# Patient Record
Sex: FEMALE | Race: WHITE | Employment: UNEMPLOYED | URBAN - METROPOLITAN AREA
[De-identification: names, ages, dates, MRNs, and addresses within clinical notes are randomized per-mention and may not be internally consistent; named-entity substitution may affect disease eponyms.]

---

## 2019-04-15 ENCOUNTER — HOSPITAL ENCOUNTER (OUTPATIENT)
Dept: ULTRASOUND IMAGING | Age: 54
Discharge: HOME | End: 2019-04-17
Payer: OTHER GOVERNMENT

## 2019-04-15 DIAGNOSIS — N81.4 VAGINAL AND CERVICAL PROLAPSE: ICD-10-CM

## 2019-04-15 DIAGNOSIS — R30.0 DYSURIA: ICD-10-CM

## 2019-04-15 DIAGNOSIS — N89.8 VAGINAL DRYNESS: ICD-10-CM

## 2019-04-15 DIAGNOSIS — N76.1 SUBACUTE VAGINITIS: ICD-10-CM

## 2019-04-15 PROCEDURE — 76856 US EXAM PELVIC COMPLETE: CPT

## 2019-04-15 PROCEDURE — 76830 TRANSVAGINAL US NON-OB: CPT

## 2019-05-16 ENCOUNTER — HOSPITAL ENCOUNTER (OUTPATIENT)
Dept: MAMMOGRAPHY | Age: 54
Discharge: HOME | End: 2019-05-18
Payer: OTHER GOVERNMENT

## 2019-05-16 DIAGNOSIS — Z12.31 VISIT FOR SCREENING MAMMOGRAM: ICD-10-CM

## 2019-05-16 DIAGNOSIS — Z13.820 SCREENING FOR OSTEOPOROSIS: ICD-10-CM

## 2019-05-16 PROCEDURE — 77063 BREAST TOMOSYNTHESIS BI: CPT

## 2019-05-16 PROCEDURE — 77080 DXA BONE DENSITY AXIAL: CPT

## 2020-09-24 ENCOUNTER — HOSPITAL ENCOUNTER (OUTPATIENT)
Dept: MAMMOGRAPHY | Age: 55
Discharge: HOME | End: 2020-09-26
Payer: OTHER GOVERNMENT

## 2020-09-24 PROCEDURE — 77067 SCR MAMMO BI INCL CAD: CPT

## 2021-01-01 ENCOUNTER — APPOINTMENT (OUTPATIENT)
Dept: CT IMAGING | Age: 56
DRG: 419 | End: 2021-01-01
Payer: OTHER GOVERNMENT

## 2021-01-01 ENCOUNTER — HOSPITAL ENCOUNTER (INPATIENT)
Age: 56
LOS: 2 days | Discharge: HOME | DRG: 419 | End: 2021-01-03
Attending: EMERGENCY MEDICINE | Admitting: HOSPITALIST
Payer: OTHER GOVERNMENT

## 2021-01-01 DIAGNOSIS — R10.84 GENERALIZED ABDOMINAL PAIN: Primary | ICD-10-CM

## 2021-01-01 DIAGNOSIS — K81.0 ACUTE CHOLECYSTITIS: ICD-10-CM

## 2021-01-01 PROBLEM — K81.9 CHOLECYSTITIS: Status: ACTIVE | Noted: 2021-01-01

## 2021-01-01 LAB
ABSOLUTE EOS #: 0.37 K/UL (ref 0–0.44)
ABSOLUTE IMMATURE GRANULOCYTE: 0.08 K/UL (ref 0–0.3)
ABSOLUTE LYMPH #: 0.89 K/UL (ref 1.1–3.7)
ABSOLUTE MONO #: 0.92 K/UL (ref 0.1–1.2)
ALBUMIN SERPL-MCNC: 4.2 G/DL (ref 3.5–5.2)
ALBUMIN/GLOBULIN RATIO: ABNORMAL (ref 1–2.5)
ALP BLD-CCNC: 149 U/L (ref 35–104)
ALT SERPL-CCNC: 15 U/L (ref 5–33)
AMYLASE: 51 U/L (ref 28–100)
ANION GAP SERPL CALCULATED.3IONS-SCNC: 11 MMOL/L (ref 9–17)
AST SERPL-CCNC: 16 U/L
BASOPHILS # BLD: 0 % (ref 0–2)
BASOPHILS ABSOLUTE: 0.04 K/UL (ref 0–0.2)
BILIRUB SERPL-MCNC: 0.4 MG/DL (ref 0.3–1.2)
BILIRUBIN DIRECT: 0.13 MG/DL
BILIRUBIN URINE: NEGATIVE
BILIRUBIN, INDIRECT: 0.27 MG/DL (ref 0–1)
BUN BLDV-MCNC: 18 MG/DL (ref 6–20)
BUN/CREAT BLD: 15 (ref 9–20)
CALCIUM SERPL-MCNC: 9.9 MG/DL (ref 8.6–10.4)
CHLORIDE BLD-SCNC: 100 MMOL/L (ref 98–107)
CO2: 26 MMOL/L (ref 20–31)
COLOR: YELLOW
COMMENT UA: NORMAL
CREAT SERPL-MCNC: 1.19 MG/DL (ref 0.5–0.9)
DIFFERENTIAL TYPE: ABNORMAL
EOSINOPHILS RELATIVE PERCENT: 3 % (ref 1–4)
GFR AFRICAN AMERICAN: 57 ML/MIN
GFR NON-AFRICAN AMERICAN: 47 ML/MIN
GFR SERPL CREATININE-BSD FRML MDRD: ABNORMAL ML/MIN/{1.73_M2}
GFR SERPL CREATININE-BSD FRML MDRD: ABNORMAL ML/MIN/{1.73_M2}
GLOBULIN: ABNORMAL G/DL (ref 1.5–3.8)
GLUCOSE BLD-MCNC: 122 MG/DL (ref 70–99)
GLUCOSE URINE: NEGATIVE
HCT VFR BLD CALC: 40.3 % (ref 36.3–47.1)
HEMOGLOBIN: 12.9 G/DL (ref 11.9–15.1)
IMMATURE GRANULOCYTES: 1 %
KETONES, URINE: NEGATIVE
LACTIC ACID: 0.7 MMOL/L (ref 0.5–2.2)
LACTIC ACID: 1 MMOL/L (ref 0.5–2.2)
LACTIC ACID: 1.2 MMOL/L (ref 0.5–2.2)
LEUKOCYTE ESTERASE, URINE: NEGATIVE
LIPASE: 85 U/L (ref 13–60)
LYMPHOCYTES # BLD: 6 % (ref 24–43)
MCH RBC QN AUTO: 29 PG (ref 25.2–33.5)
MCHC RBC AUTO-ENTMCNC: 32 G/DL (ref 28.4–34.8)
MCV RBC AUTO: 90.6 FL (ref 82.6–102.9)
MONOCYTES # BLD: 7 % (ref 3–12)
NITRITE, URINE: NEGATIVE
NRBC AUTOMATED: 0 PER 100 WBC
PDW BLD-RTO: 13.2 % (ref 11.8–14.4)
PH UA: 6 (ref 5–8)
PLATELET # BLD: 235 K/UL (ref 138–453)
PLATELET ESTIMATE: ABNORMAL
PMV BLD AUTO: 9.1 FL (ref 8.1–13.5)
POTASSIUM SERPL-SCNC: 3.4 MMOL/L (ref 3.7–5.3)
PROTEIN UA: NEGATIVE
RBC # BLD: 4.45 M/UL (ref 3.95–5.11)
RBC # BLD: ABNORMAL 10*6/UL
SEG NEUTROPHILS: 83 % (ref 36–65)
SEGMENTED NEUTROPHILS ABSOLUTE COUNT: 11.66 K/UL (ref 1.5–8.1)
SODIUM BLD-SCNC: 137 MMOL/L (ref 135–144)
SPECIFIC GRAVITY UA: 1.01 (ref 1–1.03)
TOTAL PROTEIN: 7.9 G/DL (ref 6.4–8.3)
TROPONIN INTERP: NORMAL
TROPONIN INTERP: NORMAL
TROPONIN T: NORMAL NG/ML
TROPONIN T: NORMAL NG/ML
TROPONIN, HIGH SENSITIVITY: 7 NG/L (ref 0–14)
TROPONIN, HIGH SENSITIVITY: 7 NG/L (ref 0–14)
TURBIDITY: CLEAR
URINE HGB: NEGATIVE
UROBILINOGEN, URINE: NORMAL
WBC # BLD: 14 K/UL (ref 3.5–11.3)
WBC # BLD: ABNORMAL 10*3/UL

## 2021-01-01 PROCEDURE — 80076 HEPATIC FUNCTION PANEL: CPT

## 2021-01-01 PROCEDURE — 6370000000 HC RX 637 (ALT 250 FOR IP): Performed by: FAMILY MEDICINE

## 2021-01-01 PROCEDURE — 2580000003 HC RX 258: Performed by: HOSPITALIST

## 2021-01-01 PROCEDURE — 96374 THER/PROPH/DIAG INJ IV PUSH: CPT

## 2021-01-01 PROCEDURE — 84484 ASSAY OF TROPONIN QUANT: CPT

## 2021-01-01 PROCEDURE — 36415 COLL VENOUS BLD VENIPUNCTURE: CPT

## 2021-01-01 PROCEDURE — 87040 BLOOD CULTURE FOR BACTERIA: CPT

## 2021-01-01 PROCEDURE — 83690 ASSAY OF LIPASE: CPT

## 2021-01-01 PROCEDURE — 6360000002 HC RX W HCPCS: Performed by: FAMILY MEDICINE

## 2021-01-01 PROCEDURE — 2580000003 HC RX 258: Performed by: EMERGENCY MEDICINE

## 2021-01-01 PROCEDURE — 99222 1ST HOSP IP/OBS MODERATE 55: CPT | Performed by: NURSE PRACTITIONER

## 2021-01-01 PROCEDURE — 6360000004 HC RX CONTRAST MEDICATION: Performed by: EMERGENCY MEDICINE

## 2021-01-01 PROCEDURE — 2580000003 HC RX 258: Performed by: FAMILY MEDICINE

## 2021-01-01 PROCEDURE — 82150 ASSAY OF AMYLASE: CPT

## 2021-01-01 PROCEDURE — 83605 ASSAY OF LACTIC ACID: CPT

## 2021-01-01 PROCEDURE — 6360000002 HC RX W HCPCS: Performed by: EMERGENCY MEDICINE

## 2021-01-01 PROCEDURE — 96375 TX/PRO/DX INJ NEW DRUG ADDON: CPT

## 2021-01-01 PROCEDURE — 6360000002 HC RX W HCPCS: Performed by: HOSPITALIST

## 2021-01-01 PROCEDURE — 6360000002 HC RX W HCPCS

## 2021-01-01 PROCEDURE — C9113 INJ PANTOPRAZOLE SODIUM, VIA: HCPCS | Performed by: EMERGENCY MEDICINE

## 2021-01-01 PROCEDURE — 99283 EMERGENCY DEPT VISIT LOW MDM: CPT

## 2021-01-01 PROCEDURE — 1200000000 HC SEMI PRIVATE

## 2021-01-01 PROCEDURE — 74177 CT ABD & PELVIS W/CONTRAST: CPT

## 2021-01-01 PROCEDURE — 6370000000 HC RX 637 (ALT 250 FOR IP): Performed by: HOSPITALIST

## 2021-01-01 PROCEDURE — 85025 COMPLETE CBC W/AUTO DIFF WBC: CPT

## 2021-01-01 PROCEDURE — 80048 BASIC METABOLIC PNL TOTAL CA: CPT

## 2021-01-01 PROCEDURE — 81003 URINALYSIS AUTO W/O SCOPE: CPT

## 2021-01-01 RX ORDER — SODIUM CHLORIDE 0.9 % (FLUSH) 0.9 %
10 SYRINGE (ML) INJECTION PRN
Status: DISCONTINUED | OUTPATIENT
Start: 2021-01-01 | End: 2021-01-03 | Stop reason: HOSPADM

## 2021-01-01 RX ORDER — ATORVASTATIN CALCIUM 40 MG/1
40 TABLET, FILM COATED ORAL DAILY
COMMUNITY

## 2021-01-01 RX ORDER — ONDANSETRON 2 MG/ML
4 INJECTION INTRAMUSCULAR; INTRAVENOUS ONCE
Status: COMPLETED | OUTPATIENT
Start: 2021-01-01 | End: 2021-01-01

## 2021-01-01 RX ORDER — ACETAMINOPHEN 650 MG/1
650 SUPPOSITORY RECTAL EVERY 6 HOURS PRN
Status: DISCONTINUED | OUTPATIENT
Start: 2021-01-01 | End: 2021-01-03 | Stop reason: HOSPADM

## 2021-01-01 RX ORDER — SODIUM CHLORIDE 9 MG/ML
10 INJECTION INTRAVENOUS ONCE
Status: COMPLETED | OUTPATIENT
Start: 2021-01-01 | End: 2021-01-01

## 2021-01-01 RX ORDER — MAGNESIUM SULFATE 1 G/100ML
1 INJECTION INTRAVENOUS PRN
Status: DISCONTINUED | OUTPATIENT
Start: 2021-01-01 | End: 2021-01-01 | Stop reason: SDUPTHER

## 2021-01-01 RX ORDER — ONDANSETRON 2 MG/ML
4 INJECTION INTRAMUSCULAR; INTRAVENOUS EVERY 6 HOURS PRN
Status: DISCONTINUED | OUTPATIENT
Start: 2021-01-01 | End: 2021-01-03 | Stop reason: HOSPADM

## 2021-01-01 RX ORDER — ATORVASTATIN CALCIUM 40 MG/1
40 TABLET, FILM COATED ORAL DAILY
Status: DISCONTINUED | OUTPATIENT
Start: 2021-01-01 | End: 2021-01-03 | Stop reason: HOSPADM

## 2021-01-01 RX ORDER — PANTOPRAZOLE SODIUM 40 MG/10ML
40 INJECTION, POWDER, LYOPHILIZED, FOR SOLUTION INTRAVENOUS ONCE
Status: COMPLETED | OUTPATIENT
Start: 2021-01-01 | End: 2021-01-01

## 2021-01-01 RX ORDER — HYDROCODONE BITARTRATE AND ACETAMINOPHEN 5; 325 MG/1; MG/1
1 TABLET ORAL EVERY 6 HOURS PRN
Status: DISCONTINUED | OUTPATIENT
Start: 2021-01-01 | End: 2021-01-03 | Stop reason: HOSPADM

## 2021-01-01 RX ORDER — HYDROMORPHONE HYDROCHLORIDE 1 MG/ML
1 INJECTION, SOLUTION INTRAMUSCULAR; INTRAVENOUS; SUBCUTANEOUS ONCE
Status: COMPLETED | OUTPATIENT
Start: 2021-01-01 | End: 2021-01-01

## 2021-01-01 RX ORDER — FAMOTIDINE 20 MG/1
20 TABLET, FILM COATED ORAL 2 TIMES DAILY
Status: DISCONTINUED | OUTPATIENT
Start: 2021-01-01 | End: 2021-01-03 | Stop reason: HOSPADM

## 2021-01-01 RX ORDER — ACETAMINOPHEN 325 MG/1
650 TABLET ORAL EVERY 6 HOURS PRN
Status: DISCONTINUED | OUTPATIENT
Start: 2021-01-01 | End: 2021-01-03 | Stop reason: HOSPADM

## 2021-01-01 RX ORDER — ONDANSETRON 4 MG/1
4 TABLET, ORALLY DISINTEGRATING ORAL EVERY 8 HOURS PRN
COMMUNITY

## 2021-01-01 RX ORDER — ONDANSETRON 2 MG/ML
INJECTION INTRAMUSCULAR; INTRAVENOUS
Status: COMPLETED
Start: 2021-01-01 | End: 2021-01-01

## 2021-01-01 RX ORDER — LOSARTAN POTASSIUM 50 MG/1
50 TABLET ORAL DAILY
Status: DISCONTINUED | OUTPATIENT
Start: 2021-01-01 | End: 2021-01-03 | Stop reason: HOSPADM

## 2021-01-01 RX ORDER — SENNA PLUS 8.6 MG/1
1 TABLET ORAL 2 TIMES DAILY PRN
Status: DISCONTINUED | OUTPATIENT
Start: 2021-01-01 | End: 2021-01-03 | Stop reason: HOSPADM

## 2021-01-01 RX ORDER — 0.9 % SODIUM CHLORIDE 0.9 %
1000 INTRAVENOUS SOLUTION INTRAVENOUS ONCE
Status: COMPLETED | OUTPATIENT
Start: 2021-01-01 | End: 2021-01-01

## 2021-01-01 RX ORDER — POTASSIUM CHLORIDE 7.45 MG/ML
10 INJECTION INTRAVENOUS PRN
Status: DISCONTINUED | OUTPATIENT
Start: 2021-01-01 | End: 2021-01-03 | Stop reason: HOSPADM

## 2021-01-01 RX ORDER — TELMISARTAN AND HYDROCHLORTHIAZIDE 40; 12.5 MG/1; MG/1
1 TABLET ORAL DAILY
Status: DISCONTINUED | OUTPATIENT
Start: 2021-01-01 | End: 2021-01-01 | Stop reason: CLARIF

## 2021-01-01 RX ORDER — SODIUM CHLORIDE 0.9 % (FLUSH) 0.9 %
10 SYRINGE (ML) INJECTION EVERY 12 HOURS SCHEDULED
Status: DISCONTINUED | OUTPATIENT
Start: 2021-01-01 | End: 2021-01-03 | Stop reason: HOSPADM

## 2021-01-01 RX ORDER — KRILL/OM-3/DHA/EPA/PHOSPHO/AST 500MG-86MG
CAPSULE ORAL
COMMUNITY

## 2021-01-01 RX ORDER — PROMETHAZINE HYDROCHLORIDE 12.5 MG/1
12.5 TABLET ORAL EVERY 6 HOURS PRN
Status: DISCONTINUED | OUTPATIENT
Start: 2021-01-01 | End: 2021-01-03 | Stop reason: HOSPADM

## 2021-01-01 RX ORDER — HYDROCHLOROTHIAZIDE 12.5 MG/1
12.5 CAPSULE, GELATIN COATED ORAL DAILY
Status: DISCONTINUED | OUTPATIENT
Start: 2021-01-01 | End: 2021-01-03 | Stop reason: HOSPADM

## 2021-01-01 RX ORDER — FLUOXETINE HYDROCHLORIDE 20 MG/1
20 CAPSULE ORAL DAILY
COMMUNITY

## 2021-01-01 RX ORDER — HEPARIN SODIUM 5000 [USP'U]/ML
5000 INJECTION, SOLUTION INTRAVENOUS; SUBCUTANEOUS 2 TIMES DAILY
Status: DISCONTINUED | OUTPATIENT
Start: 2021-01-01 | End: 2021-01-03 | Stop reason: HOSPADM

## 2021-01-01 RX ORDER — 0.9 % SODIUM CHLORIDE 0.9 %
80 INTRAVENOUS SOLUTION INTRAVENOUS ONCE
Status: COMPLETED | OUTPATIENT
Start: 2021-01-01 | End: 2021-01-01

## 2021-01-01 RX ORDER — FLUOXETINE HYDROCHLORIDE 20 MG/1
20 CAPSULE ORAL DAILY
Status: DISCONTINUED | OUTPATIENT
Start: 2021-01-01 | End: 2021-01-03 | Stop reason: HOSPADM

## 2021-01-01 RX ORDER — DICYCLOMINE HYDROCHLORIDE 10 MG/1
10 CAPSULE ORAL
COMMUNITY

## 2021-01-01 RX ORDER — MORPHINE SULFATE 2 MG/ML
1 INJECTION, SOLUTION INTRAMUSCULAR; INTRAVENOUS EVERY 4 HOURS PRN
Status: DISCONTINUED | OUTPATIENT
Start: 2021-01-01 | End: 2021-01-03 | Stop reason: HOSPADM

## 2021-01-01 RX ORDER — SODIUM CHLORIDE 0.9 % (FLUSH) 0.9 %
10 SYRINGE (ML) INJECTION PRN
Status: DISCONTINUED | OUTPATIENT
Start: 2021-01-01 | End: 2021-01-01 | Stop reason: SDUPTHER

## 2021-01-01 RX ORDER — MAGNESIUM SULFATE 1 G/100ML
1 INJECTION INTRAVENOUS PRN
Status: DISCONTINUED | OUTPATIENT
Start: 2021-01-01 | End: 2021-01-03 | Stop reason: HOSPADM

## 2021-01-01 RX ORDER — POTASSIUM CHLORIDE 20 MEQ/1
40 TABLET, EXTENDED RELEASE ORAL PRN
Status: DISCONTINUED | OUTPATIENT
Start: 2021-01-01 | End: 2021-01-03 | Stop reason: HOSPADM

## 2021-01-01 RX ORDER — NIACIN 100 MG
100 TABLET ORAL
COMMUNITY

## 2021-01-01 RX ORDER — TELMISARTAN AND HYDROCHLORTHIAZIDE 40; 12.5 MG/1; MG/1
1 TABLET ORAL DAILY
COMMUNITY

## 2021-01-01 RX ORDER — ASPIRIN 81 MG/1
81 TABLET, CHEWABLE ORAL DAILY
COMMUNITY

## 2021-01-01 RX ADMIN — HEPARIN SODIUM 5000 UNITS: 5000 INJECTION INTRAVENOUS; SUBCUTANEOUS at 15:08

## 2021-01-01 RX ADMIN — PIPERACILLIN SODIUM AND TAZOBACTAM SODIUM 4.5 G: 4; .5 INJECTION, POWDER, LYOPHILIZED, FOR SOLUTION INTRAVENOUS at 09:26

## 2021-01-01 RX ADMIN — LOSARTAN POTASSIUM 50 MG: 50 TABLET, FILM COATED ORAL at 13:00

## 2021-01-01 RX ADMIN — SODIUM CHLORIDE 1000 ML: 9 INJECTION, SOLUTION INTRAVENOUS at 06:45

## 2021-01-01 RX ADMIN — ONDANSETRON 4 MG: 2 INJECTION INTRAMUSCULAR; INTRAVENOUS at 06:39

## 2021-01-01 RX ADMIN — HYDROCODONE BITARTRATE AND ACETAMINOPHEN 1 TABLET: 5; 325 TABLET ORAL at 13:16

## 2021-01-01 RX ADMIN — HYDROCHLOROTHIAZIDE 12.5 MG: 12.5 CAPSULE ORAL at 13:00

## 2021-01-01 RX ADMIN — SODIUM CHLORIDE 80 ML: 9 INJECTION, SOLUTION INTRAVENOUS at 07:51

## 2021-01-01 RX ADMIN — HYDROMORPHONE HYDROCHLORIDE 1 MG: 1 INJECTION, SOLUTION INTRAMUSCULAR; INTRAVENOUS; SUBCUTANEOUS at 06:39

## 2021-01-01 RX ADMIN — FAMOTIDINE 20 MG: 20 TABLET, FILM COATED ORAL at 15:07

## 2021-01-01 RX ADMIN — Medication 10 ML: at 06:39

## 2021-01-01 RX ADMIN — ONDANSETRON 4 MG: 2 INJECTION INTRAMUSCULAR; INTRAVENOUS at 20:01

## 2021-01-01 RX ADMIN — MORPHINE SULFATE 1 MG: 2 INJECTION, SOLUTION INTRAMUSCULAR; INTRAVENOUS at 20:01

## 2021-01-01 RX ADMIN — Medication 10 ML: at 20:02

## 2021-01-01 RX ADMIN — PIPERACILLIN AND TAZOBACTAM 3.38 G: 3; .375 INJECTION, POWDER, LYOPHILIZED, FOR SOLUTION INTRAVENOUS at 16:13

## 2021-01-01 RX ADMIN — ONDANSETRON 4 MG: 2 INJECTION INTRAMUSCULAR; INTRAVENOUS at 11:41

## 2021-01-01 RX ADMIN — IOPAMIDOL 75 ML: 755 INJECTION, SOLUTION INTRAVENOUS at 07:51

## 2021-01-01 RX ADMIN — PROMETHAZINE HYDROCHLORIDE 12.5 MG: 12.5 TABLET ORAL at 13:16

## 2021-01-01 RX ADMIN — POTASSIUM CHLORIDE 40 MEQ: 20 TABLET, EXTENDED RELEASE ORAL at 16:58

## 2021-01-01 RX ADMIN — HYDROMORPHONE HYDROCHLORIDE 1 MG: 1 INJECTION, SOLUTION INTRAMUSCULAR; INTRAVENOUS; SUBCUTANEOUS at 09:38

## 2021-01-01 RX ADMIN — ONDANSETRON HYDROCHLORIDE 4 MG: 2 INJECTION, SOLUTION INTRAVENOUS at 11:41

## 2021-01-01 RX ADMIN — PANTOPRAZOLE SODIUM 40 MG: 40 INJECTION, POWDER, FOR SOLUTION INTRAVENOUS at 06:39

## 2021-01-01 RX ADMIN — Medication 10 ML: at 07:51

## 2021-01-01 RX ADMIN — MORPHINE SULFATE 1 MG: 2 INJECTION, SOLUTION INTRAMUSCULAR; INTRAVENOUS at 15:24

## 2021-01-01 ASSESSMENT — PAIN DESCRIPTION - PAIN TYPE
TYPE: ACUTE PAIN

## 2021-01-01 ASSESSMENT — PAIN DESCRIPTION - ONSET: ONSET: ON-GOING

## 2021-01-01 ASSESSMENT — PAIN SCALES - GENERAL
PAINLEVEL_OUTOF10: 3
PAINLEVEL_OUTOF10: 5

## 2021-01-01 ASSESSMENT — PAIN DESCRIPTION - LOCATION
LOCATION: ABDOMEN

## 2021-01-01 ASSESSMENT — PAIN DESCRIPTION - FREQUENCY: FREQUENCY: CONTINUOUS

## 2021-01-01 NOTE — H&P
All 10 point system is reviewed and negative otherwise mentioned in HPI. Past Medical History:     Past Medical History:   Diagnosis Date    Hypertension         Past Surgical History:     History reviewed. No pertinent surgical history. Medications Prior to Admission:       Prior to Admission medications    Medication Sig Start Date End Date Taking? Authorizing Provider   aspirin 81 MG chewable tablet Take 81 mg by mouth daily   Yes Historical Provider, MD   atorvastatin (LIPITOR) 40 MG tablet Take 40 mg by mouth daily   Yes Historical Provider, MD   Coenzyme Q10 (COQ-10) 100 MG CAPS Take by mouth   Yes Historical Provider, MD   dicyclomine (BENTYL) 10 MG/5ML syrup Take 20 mg by mouth 4 times daily (before meals and nightly)   Yes Historical Provider, MD   Flaxseed, Linseed, (FLAX SEEDS PO) Take by mouth   Yes Historical Provider, MD   Ray Abbot Oil 500 MG CAPS Take by mouth   Yes Historical Provider, MD   linaclotide (LINZESS) 145 MCG capsule Take 145 mcg by mouth every morning (before breakfast)   Yes Historical Provider, MD   niacin 100 MG tablet Take 100 mg by mouth daily (with breakfast)   Yes Historical Provider, MD   FLUoxetine (PROZAC) 20 MG capsule Take 20 mg by mouth daily   Yes Historical Provider, MD   telmisartan-hydrochlorothiazide (MICARDIS HCT) 40-12.5 MG per tablet Take 1 tablet by mouth daily   Yes Historical Provider, MD   ondansetron (ZOFRAN-ODT) 4 MG disintegrating tablet Take 4 mg by mouth every 8 hours as needed for Nausea or Vomiting   Yes Historical Provider, MD        Allergies:       Patient has no known allergies. Social History:     Tobacco:    reports that she has never smoked. She has never used smokeless tobacco.  Alcohol:      has no history on file for alcohol. Drug Use:  has no history on file for drug. Family History:     History reviewed. No pertinent family history.       Physical Exam: Vitals:  BP (!) 141/66   Pulse 115   Temp 98.4 °F (36.9 °C) (Oral)   Resp 16   SpO2 97%   Temp (24hrs), Av.4 °F (36.9 °C), Min:98.4 °F (36.9 °C), Max:98.4 °F (36.9 °C)          General appearance - alert, well appearing, and in no acute distress  Mental status - oriented to person, place, and time with normal affect  Head - normocephalic and atraumatic  Eyes - pupils equal and reactive, extraocular eye movements intact, conjunctiva clear  Ears - hearing appears to be intact  Nose - no drainage noted  Mouth - mucous membranes moist  Neck - supple, no carotid bruits, thyroid not palpable  Chest - clear to auscultation, normal effort  Heart - normal rate, regular rhythm, no murmur  Abdomen - soft, right upper quadrant tenderness, nondistended, bowel sounds present all four quadrants, no masses, hepatomegaly or splenomegaly  Neurological - normal speech, no focal findings or movement disorder noted, cranial nerves II through XII grossly intact  Extremities - peripheral pulses palpable, no pedal edema or calf pain with palpation  Skin - no gross lesions, rashes, or induration noted        Data:     Labs:    Hematology:  Recent Labs     21  0645   WBC 14.0*   RBC 4.45   HGB 12.9   HCT 40.3   MCV 90.6   MCH 29.0   MCHC 32.0   RDW 13.2      MPV 9.1     Chemistry:  Recent Labs     21  0645      K 3.4*      CO2 26   GLUCOSE 122*   BUN 18   CREATININE 1.19*   ANIONGAP 11   LABGLOM 47*   GFRAA 57*   CALCIUM 9.9     Recent Labs     21  0645   PROT 7.9   LABALBU 4.2   AST 16   ALT 15   ALKPHOS 149*   BILITOT 0.40   BILIDIR 0.13   AMYLASE 51   LIPASE 85*       No results found for: INR, PROTIME    No results found for: SPECIAL  No results found for: CULTURE    No results found for: POCPH, PHART, PH, POCPCO2, RUA1MAI, PCO2, POCPO2, PO2ART, PO2, POCHCO3, OBK0DCF, HCO3, NBEA, PBEA, BEART, BE, THGBART, THB, BUU4IIF, PFBW6FVR, L7HRNJJR, O2SAT, FIO2    Radiology: Ct Abdomen Pelvis W Iv Contrast    Result Date: 1/1/2021  1. CT findings most compatible with acute cholecystitis. 2.  Mild wall thickening at the proximal duodenum, likely reactive. 3.  Uterine fibroids. All radiological studies reviewed                Code Status:  Full Code    Electronically signed by rToy Tomlinson MD on 1/1/2021 at 11:24 AM     Copy sent to Dr. Renata Rice DO    This note was created with the assistance of a speech-recognition program.  Although the intention is to generate a document that actually reflects the content of the visit, no guarantees can be provided that every mistake has been identified and corrected by editing. Note was updated later by me after  physical examination and  completion of the assessment.

## 2021-01-01 NOTE — ED PROVIDER NOTES
[address] [place] [address] ED  eMERGENCY dEPARTMENT eNCOUnter      Pt Name: Lizzie Barnes  MRN: [id number]  [place] 1965  Date of evaluation: 1/1/2021  Provider: Kishan Gee MD    CHIEF COMPLAINT       Chief Complaint   Patient presents with    Abdominal Pain         HISTORY OF PRESENT ILLNESS  (Location/Symptom, Timing/Onset, Context/Setting, Quality, Duration, Modifying Factors, Severity.)   Lizzie Barnes is a 60 y.o. female who presents to the emergency department for evaluation of abdominal pain. Patient had onset of abdominal pain last evening. Pain was initially more intense in the left lower quadrant however the last 12 hours has developed increasing intensity of the right upper and right lower quadrant. Patient has had abdominal pain issues before and was thought to possibly have IBS. She states she has had x-ray imaging but no other evaluation. She states the pain today is very severe. She can barely walk due to the discomfort with movement. She denies fevers or chills she has had nausea no vomiting. She denies constipation or diarrhea symptoms. Nursing Notes were reviewed. ALLERGIES     Patient has no known allergies.     CURRENT MEDICATIONS       Previous Medications    ASPIRIN 81 MG CHEWABLE TABLET    Take 81 mg by mouth daily    ATORVASTATIN (LIPITOR) 40 MG TABLET    Take 40 mg by mouth daily    COENZYME Q10 (COQ-10) 100 MG CAPS    Take by mouth    DICYCLOMINE (BENTYL) 10 MG/5ML SYRUP    Take 20 mg by mouth 4 times daily (before meals and nightly)    FLAXSEED, LINSEED, (FLAX SEEDS PO)    Take by mouth    FLUOXETINE (PROZAC) 20 MG CAPSULE    Take 20 mg by mouth daily    KRILL  MG CAPS    Take by mouth    LINACLOTIDE (LINZESS) 145 MCG CAPSULE    Take 145 mcg by mouth every morning (before breakfast)    NIACIN 100 MG TABLET    Take 100 mg by mouth daily (with breakfast) ONDANSETRON (ZOFRAN-ODT) 4 MG DISINTEGRATING TABLET    Take 4 mg by mouth every 8 hours as needed for Nausea or Vomiting    TELMISARTAN-HYDROCHLOROTHIAZIDE (MICARDIS HCT) 40-12.5 MG PER TABLET    Take 1 tablet by mouth daily       PAST MEDICAL HISTORY         Diagnosis Date    Hypertension        SURGICAL HISTORY     History reviewed. No pertinent surgical history. FAMILY HISTORY     History reviewed. No pertinent family history. No family status information on file. SOCIAL HISTORY      reports that she has never smoked. She has never used smokeless tobacco.    REVIEW OF SYSTEMS    (2-9 systems for level 4, 10 or more for level 5)     Review of Systems   Unable to perform ROS: Acuity of condition       Except as noted above the remainder of the review of systems was reviewed and negative.      PHYSICAL EXAM    (up to 7 for level 4, 8 or more for level 5)     Vitals:    01/01/21 0615   BP: (!) 141/66   Pulse: 115   Resp: 16   Temp: 98.4 °F (36.9 °C)   TempSrc: Oral   SpO2: 97% Physical exam reflects a pleasant relatively well-nourished female. She does appear very uncomfortable. Her movements are antalgic. She is mildly tachycardic. She is afebrile. Pulse ox 97% on room air. She is not hypoxic. She is alert conversive and appropriate in behavior. Integument warm and dry she does appear mildly pale. Heart regular rate and rhythm normal S1-S2 no murmurs rubs or gallops. Lungs are clear to auscultation without wheezes rales or rhonchi. Abdomen is soft but exquisitely tender in the right upper quadrant and right lower quadrant with rebound and guarding behavior noted. Right upper quadrant appears more uncomfortable than lower. Although she endorsed subjective left-sided discomfort there is no left-sided discomfort that is reproducible on exam.  No flank pain noted. Extremities show no cyanosis clubbing or edema. Visualized integument without rash or lesion. She has no acute neurovascular deficits. DIAGNOSTIC RESULTS     EKG: All EKG's are interpreted by the Emergency Department Physician who either signs or Co-signs this chart in the absence of a cardiologist.      RADIOLOGY:   Non-plain film images such as CT, Ultrasound and MRI are read by the radiologist. Plain radiographic images are visualized and preliminarily interpreted by the emergency physician with the below findings:    Pending at shift change    Interpretation per the Radiologist below, if available at the time of this note:    Genie    (Results Pending)           LABS:  Labs Reviewed   AMYLASE   BASIC METABOLIC PANEL   CBC WITH AUTO DIFFERENTIAL   HEPATIC FUNCTION PANEL   LIPASE   URINALYSIS   LACTIC ACID     Pending at shift change    All other labs were within normal range or not returned as of this dictation.     EMERGENCY DEPARTMENT COURSE and DIFFERENTIAL DIAGNOSIS/MDM:   Vitals:    Vitals:    01/01/21 0615   BP: (!) 141/66   Pulse: 115   Resp: 16   Temp: 98.4 °F (36.9 °C) TempSrc: Oral   SpO2: 97%     Patient is evaluated on arrival.  She is very uncomfortable. IV access is established. She is treated with IV fluids and medications for her discomfort and nausea. Laboratory and imaging investigations are requested to include CT abdomen pelvis. Care is turned over to Dr. Mary Milner at shift change for review of investigations, reevaluation of patient status, additional care and ultimate disposition    CONSULTS:  None    PROCEDURES:  None    FINAL IMPRESSION      1. Generalized abdominal pain          DISPOSITION/PLAN   DISPOSITION Ed Observation 01/01/2021 06:33:59 AM  Disposition per Dr Lauren Robledo TO:   No follow-up provider specified. DISCHARGE MEDICATIONS:     New Prescriptions    No medications on file       Controlled Substance Monitoring:    Acute and Chronic Pain Monitoring:   RX Monitoring 1/1/2021   Periodic Controlled Substance Monitoring No signs of potential drug abuse or diversion identified.          (Please note that portions of this note were completed with a voice recognition program.  Efforts were made to edit the dictations but occasionally words are mis-transcribed.)    Debbie Canada MD  Attending Emergency Physician          Debbie Canada MD  01/01/21 7905

## 2021-01-01 NOTE — CONSULTS
[facility]  Initial Consult Note  Date: 1/1/2021    Hospital day :0     Impression:   · Suspected acute cholecystitis    Recommendations   · Patient has been started on IV Zosyn and I do agree with this  · Blood cultures have been obtained  · General surgery has seen and evaluated the patient. They have recommended HIDA scan, IV antibiotics and possibly surgery at some point or percutaneous drainage depending on the HIDA scan  · I did discussed with Dr. Theresa Guerrero, he is comfortable managing this patient without our assistance  · Please call us if needed    Chief complaint/reason for consultation:   Abdominal pain x1 week/cholecystitis    History of Present Illness:   Dash Chavarria is a 60y.o.-year-old female who was initially admitted on 1/1/2021. Patient presented to [facility] emergency department with a 1 week history of intractable upper abdominal pain. The pain started in her left upper hydrant then migrated throughout the entire abdomen. She has not had any associated fevers or chills. She has felt fatigued and has had associated nausea and vomiting. Her symptoms do increase with eating. No change in bowel habits. She has taken over-the-counter Pepto-Bismol as well as Bentyl. The pain really started on Monday, did slightly improve so she decided not to come then started to worsen again last night so she decided to come to the emergency department this morning. CT scan did reveal acute cholecystitis. Patient has been started on IV Zosyn and we were consulted assist with antimicrobial therapy management. I have personally reviewed the past medical history, past surgical history, medications, social history, and family history, and I have updated the database accordingly. Past Medical History:     Past Medical History:   Diagnosis Date    Hypertension      Past Surgical  History:   History reviewed. No pertinent surgical history.   Medications:  sodium chloride flush  10 mL Intravenous 2 times per day    famotidine  20 mg Oral BID    heparin (porcine)  5,000 Units Subcutaneous BID    piperacillin-tazobactam  3.375 g Intravenous Q8H    atorvastatin  40 mg Oral Daily    FLUoxetine  20 mg Oral Daily    [START ON 1/2/2021] linaclotide  145 mcg Oral QAM AC    losartan  50 mg Oral Daily    And    hydroCHLOROthiazide  12.5 mg Oral Daily     Social History:     Social History     Socioeconomic History    Marital status:      Spouse name: Not on file    Number of children: Not on file    Years of education: Not on file    Highest education level: Not on file   Occupational History    Not on file   Social Needs    Financial resource strain: Not on file    Food insecurity     Worry: Not on file     Inability: Not on file    Transportation needs     Medical: Not on file     Non-medical: Not on file   Tobacco Use    Smoking status: Never Smoker    Smokeless tobacco: Never Used   Substance and Sexual Activity    Alcohol use: Not on file    Drug use: Not on file    Sexual activity: Not on file   Lifestyle    Physical activity     Days per week: Not on file     Minutes per session: Not on file    Stress: Not on file   Relationships    Social connections     Talks on phone: Not on file     Gets together: Not on file     Attends Sabianism service: Not on file     Active member of club or organization: Not on file     Attends meetings of clubs or organizations: Not on file     Relationship status: Not on file    Intimate partner violence     Fear of current or ex partner: Not on file     Emotionally abused: Not on file     Physically abused: Not on file     Forced sexual activity: Not on file   Other Topics Concern    Not on file   Social History Narrative    Not on file     Family History:   History reviewed. No pertinent family history. Allergies:   Patient has no known allergies. Review of Systems:   General: No fevers or chills. Eyes: No double vision or blurry vision. ENT: No sore throat or runny nose. Cardiovascular: No chest pain or palpitations. Lung: No shortness of breath or cough. Abdomen: Abdominal pain  Genitourinary: No increased urinary frequency, or dysuria. Musculoskeletal: No muscle aches or pains. Hematologic: No bleeding or bruising. Neurologic: No headache, weakness, numbness, or tingling. Physical Examination :   BP (!) 123/47   Pulse 84   Temp 97.7 °F (36.5 °C) (Oral)   Resp 16   SpO2 98%     Temperature Range: Temp: 97.7 °F (36.5 °C) Temp  Av.1 °F (36.7 °C)  Min: 97.7 °F (36.5 °C)  Max: 98.4 °F (36.9 °C)  General Appearance: Awake, alert, and in no apparent distress  Head: Normocephalic, without obvious abnormality, atraumatic  Eyes: Pupils equal, round, reactive, to light and accommodation; extraocular movements intact; sclera anicteric; conjunctivae pink  ENT: Oropharynx clear, without erythema, exudate, or thrush. Neck: Supple, without lymphadenopathy. Pulmonary/Chest: Clear to auscultation, without wheezes, rales, or rhonchi  Cardiovascular: Regular rate and rhythm without murmurs, rubs, or gallops. Abdomen: Soft, bowel sounds present. Diffuse abdominal pain in the upper quadrants  Extremities: No cyanosis, clubbing, edema, or effusions. Neurologic: No gross sensory or motor deficits. Skin: Warm and dry with no rash.     Medical Decision Making:   I have independently reviewed/ordered the following labs:  CBC with Differential:   Recent Labs     21  0645   WBC 14.0*   HGB 12.9   HCT 40.3      LYMPHOPCT 6*   MONOPCT 7     BMP:   Recent Labs     21  0645      K 3.4*      CO2 26   BUN 18   CREATININE 1.19*     Hepatic Function Panel:   Recent Labs     21  0645   PROT 7.9   LABALBU 4.2   BILIDIR 0.13   IBILI 0.27   BILITOT 0.40   ALKPHOS 149*   ALT 15   AST 16       No results found for: PROCAL    No results found for: CRP  No results found for: FERRITIN EXAMINATION: CT OF THE ABDOMEN AND PELVIS WITH CONTRAST 1/1/2021 7:32 am TECHNIQUE: CT of the abdomen and pelvis was performed with the administration of intravenous contrast. Multiplanar reformatted images are provided for review. Dose modulation, iterative reconstruction, and/or weight based adjustment of the mA/kV was utilized to reduce the radiation dose to as low as reasonably achievable. COMPARISON: None HISTORY: ORDERING SYSTEM PROVIDED HISTORY: Pain TECHNOLOGIST PROVIDED HISTORY: IV Only Contrast Pain Reason for Exam: Mid abd pain x 5 days, nausea Acuity: Acute Type of Exam: Initial FINDINGS: Lower Chest:  Visualized portion of the lower chest demonstrates no acute abnormality. Organs: Liver is within normal limits. Gallbladder is distended with wall thickening, cholelithiasis, and pericholecystic inflammatory change. Tiny amount of pericholecystic fluid is noted as well. No intrahepatic or extrahepatic biliary dilatation. Spleen, pancreas, and adrenal glands are within normal limits. There is symmetric enhancement of the kidneys. No hydronephrosis or perinephric inflammation. GI/Bowel: There is mild wall thickening at the proximal duodenum. There is no abnormal bowel distention or pericolonic inflammation. No free intraperitoneal air or fluid. Appendix is normal. Pelvis: Urinary bladder is within normal limits. Uterus is heterogeneous and lobulated, compatible with known fibroids. No dominant adnexal mass. No free fluid in the pelvis. No pelvic lymphadenopathy. Peritoneum/Retroperitoneum: The abdominal aorta is normal in caliber. There is no retroperitoneal or mesenteric lymphadenopathy. Bones/Soft Tissues: There is no acute or suspicious osseous abnormality. Visualized superficial soft tissues are within normal limits. 1.  CT findings most compatible with acute cholecystitis. 2.  Mild wall thickening at the proximal duodenum, likely reactive. 3.  Uterine fibroids.        Cultures: Ordered      Thank you for allowing us to participate in the care of this patient. Please call with questions. Electronically signed by KENNY Gonzalez CNP on 1/1/2021 at 3:27 PM      [facility]  [id number] [employer]  OFFICE: [phone]      This note is created with the assistance of a speech recognition program.  While intending to generate a document that actually reflects the content of the visit, the document can still have some errors including those of syntax and sound a like substitutions which may escape proof reading. In such instances, actual meaning can be extrapolated by contextual diversion.

## 2021-01-01 NOTE — CONSULTS
Surgical Consult    NAME:  Amara Sandhu  MRN: [id number]   YOB: 1965   Date: 1/1/2021   Age: 60 y.o. Gender: female     There is no height or weight on file to calculate BMI. Chief Complaint: Abdominal pain for the last week    History of Present Illness: This is a very pleasant 57-year-old female who reportedly has had intermittent episodes of similar pain in the past.  The patient states that for the last week, she has had intractable upper abdominal pain. Her pain began initially in the left upper quadrant and then migrated more throughout the entire upper abdomen. She denies any fevers or chills. The patient states that she has had generalized malaise in addition to repeated nausea and vomiting. Her symptoms began after eating mashed potatoes. The patient states that she has not had any bowel habit changes or blood in her stools. She did try a variety of over-the-counter medications including Pepto without any improvement of her symptoms. The patient states that she has had intermittent waxing and waning of her symptoms, but her pain is always been there with only brief episodes of less discomfort. She denies any visible blood in her stools. The patient denies any history of pancreatitis, hepatitis, or peptic ulcer disease. The patient denies any history of jaundice, light-colored stools, or tea colored urine. No current facility-administered medications on file prior to encounter.       Current Outpatient Medications on File Prior to Encounter   Medication Sig Dispense Refill    aspirin 81 MG chewable tablet Take 81 mg by mouth daily      atorvastatin (LIPITOR) 40 MG tablet Take 40 mg by mouth daily      Coenzyme Q10 (COQ-10) 100 MG CAPS Take by mouth      dicyclomine (BENTYL) 10 MG/5ML syrup Take 20 mg by mouth 4 times daily (before meals and nightly)      Flaxseed, Linseed, (FLAX SEEDS PO) Take by mouth      Krill Oil 500 MG CAPS Take by mouth  linaclotide (LINZESS) 145 MCG capsule Take 145 mcg by mouth every morning (before breakfast)      niacin 100 MG tablet Take 100 mg by mouth daily (with breakfast)      FLUoxetine (PROZAC) 20 MG capsule Take 20 mg by mouth daily      telmisartan-hydrochlorothiazide (MICARDIS HCT) 40-12.5 MG per tablet Take 1 tablet by mouth daily      ondansetron (ZOFRAN-ODT) 4 MG disintegrating tablet Take 4 mg by mouth every 8 hours as needed for Nausea or Vomiting         No Known Allergies    Past Medical History:   Diagnosis Date    Hypertension        Past surgical history: No prior abdominal surgeries    History reviewed. No pertinent family history.      Social History     Socioeconomic History    Marital status:      Spouse name: None    Number of children: None    Years of education: None    Highest education level: None   Occupational History    None   Social Needs    Financial resource strain: None    Food insecurity     Worry: None     Inability: None    Transportation needs     Medical: None     Non-medical: None   Tobacco Use    Smoking status: Never Smoker    Smokeless tobacco: Never Used   Substance and Sexual Activity    Alcohol use: None    Drug use: None    Sexual activity: None   Lifestyle    Physical activity     Days per week: None     Minutes per session: None    Stress: None   Relationships    Social connections     Talks on phone: None     Gets together: None     Attends Cheondoism service: None     Active member of club or organization: None     Attends meetings of clubs or organizations: None     Relationship status: None    Intimate partner violence     Fear of current or ex partner: None     Emotionally abused: None     Physically abused: None     Forced sexual activity: None   Other Topics Concern    None   Social History Narrative    None       Review of Systems - History obtained from chart review and the patient  General ROS: positive for  - malaise Psychological ROS: negative for - anxiety or depression  Ophthalmic ROS: negative for - blurry vision or eye pain  ENT ROS: negative for - epistaxis or oral lesions  Allergy and Immunology ROS: negative for - hives or itchy/watery eyes  Hematological and Lymphatic ROS: negative for - bleeding problems, blood clots, blood transfusions or jaundice  Endocrine ROS: negative for - malaise/lethargy, polydipsia/polyuria or unexpected weight changes  Respiratory ROS: no cough, shortness of breath, or wheezing  Cardiovascular ROS: no chest pain or dyspnea on exertion  Gastrointestinal ROS: positive for - abdominal pain, gas/bloating, heartburn and nausea/vomiting  Genito-Urinary ROS: no dysuria, trouble voiding, or hematuria  Musculoskeletal ROS: negative for - joint pain, joint stiffness or joint swelling  Neurological ROS: no TIA or stroke symptoms  Dermatological ROS: negative for - pruritus, rash or skin lesion changes    Vitals:    01/01/21 0615   BP: (!) 141/66   Pulse: 115   Resp: 16   Temp: 98.4 °F (36.9 °C)   SpO2: 97%        No intake/output data recorded.     General Appearance: alert and oriented to person, place and time, well-developed and well nourished and nontoxic  Skin: warm and dry, no rash or erythema  NontoxicHead: normocephalic and atraumatic  Eyes: pupils equal, round, and reactive to light, extraocular eye movements intact, conjunctivae normal  ENT: tympanic membrane, external ear and ear canal normal bilaterally, oropharynx clear and moist with normal mucous membranes and hearing grossly normal bilaterally  Neck: neck supple and non tender without mass, no thyromegaly or thyroid nodules, no cervical lymphadenopathy   Pulmonary/Chest: clear to auscultation bilaterally- no wheezes, rales or rhonchi, normal air movement, no respiratory distress  Cardiovascular: normal rate, normal S1 and S2, no gallops, intact distal pulses and no carotid bruits Abdomen: Soft, diffuse upper abdominal discomfort with deep palpation, bowel sounds present, no hernias appreciated  Genitourinary: genitals normal without hernia or inguinal adenopathy  Extremities: no cyanosis, no clubbing and Mercy's sign negative bilaterally  Musculoskeletal: normal range of motion, no joint swelling, deformity or tenderness  Neurologic: no cranial nerve deficit and speech normal    Hemoglobin   Date Value Ref Range Status   01/01/2021 12.9 11.9 - 15.1 g/dL Final     Hematocrit   Date Value Ref Range Status   01/01/2021 40.3 36.3 - 47.1 % Final     WBC   Date Value Ref Range Status   01/01/2021 14.0 (H) 3.5 - 11.3 k/uL Final     Sodium   Date Value Ref Range Status   01/01/2021 137 135 - 144 mmol/L Final     Potassium   Date Value Ref Range Status   01/01/2021 3.4 (L) 3.7 - 5.3 mmol/L Final     Chloride   Date Value Ref Range Status   01/01/2021 100 98 - 107 mmol/L Final     CO2   Date Value Ref Range Status   01/01/2021 26 20 - 31 mmol/L Final     BUN   Date Value Ref Range Status   01/01/2021 18 6 - 20 mg/dL Final     Glucose   Date Value Ref Range Status   01/01/2021 122 (H) 70 - 99 mg/dL Final        CT ABDOMEN PELVIS W IV CONTRAST  Status: Preliminary result   Order Providers    Laquita Reese MD          Reading Providers    Read Date Phone Pager   Kena Palacios Fri Jan 1, 2021  8:08 [phone]    Radiation Dose Estimates    No radiation information found for this patient   Narrative   EXAMINATION:   CT OF THE ABDOMEN AND PELVIS WITH CONTRAST 1/1/2021 7:32 am       TECHNIQUE:   CT of the abdomen and pelvis was performed with the administration of   intravenous contrast. Multiplanar reformatted images are provided for review.    Dose modulation, iterative reconstruction, and/or weight based adjustment of   the mA/kV was utilized to reduce the radiation dose to as low as reasonably   achievable.       COMPARISON:   None       HISTORY: ORDERING SYSTEM PROVIDED HISTORY: Pain   TECHNOLOGIST PROVIDED HISTORY:   IV Only Contrast   Pain   Reason for Exam: Mid abd pain x 5 days, nausea   Acuity: Acute   Type of Exam: Initial       FINDINGS:   Lower Chest:  Visualized portion of the lower chest demonstrates no acute   abnormality.       Organs: Liver is within normal limits.  Gallbladder is distended with wall   thickening, cholelithiasis, and pericholecystic inflammatory change.  Tiny   amount of pericholecystic fluid is noted as well.  No intrahepatic or   extrahepatic biliary dilatation.  Spleen, pancreas, and adrenal glands are   within normal limits.       There is symmetric enhancement of the kidneys.  No hydronephrosis or   perinephric inflammation.       GI/Bowel: There is mild wall thickening at the proximal duodenum.  There is   no abnormal bowel distention or pericolonic inflammation.  No free   intraperitoneal air or fluid.  Appendix is normal.       Pelvis: Urinary bladder is within normal limits.  Uterus is heterogeneous and   lobulated, compatible with known fibroids.  No dominant adnexal mass.  No   free fluid in the pelvis.  No pelvic lymphadenopathy.       Peritoneum/Retroperitoneum: The abdominal aorta is normal in caliber.  There   is no retroperitoneal or mesenteric lymphadenopathy.       Bones/Soft Tissues: There is no acute or suspicious osseous abnormality.    Visualized superficial soft tissues are within normal limits.           Impression   1.  CT findings most compatible with acute cholecystitis.       2.  Mild wall thickening at the proximal duodenum, likely reactive.       3.  Uterine fibroids.              Assessment: Suspected acute cholecystitis Plan: At this point, I had a lengthy discussion with the patient regarding the length of her symptoms. She has not been feeling well for almost a week. The patient states that she has had generalized malaise with persistent abdominal pain and only mild episodes of brief improvement over the last week. Her CAT scan suggests severe inflammation in the right upper quadrant with reactive changes involving the duodenum. I discussed with the patient obtaining a HIDA scan, reassessing her exam, and going from there. We will discuss her options at that point including surgery, intravenous antibiotics and conservative management, and possibility of percutaneous drainage. She is on antibiotics currently. I have made her n.p.o.  I would hold all blood thinning medications for the time being. Unfortunately the patient has eaten lunch as ordered by the admitting physician which has made further evaluation or management of the possible cholecystitis more difficult.   Electronically signed by Maureen Saavedra MD on 1/1/2021 at 2:41 PM

## 2021-01-01 NOTE — ED NOTES
Spoke with Klarissa from [address] [place] [address], pt will get a HIDA Scan tomorrow. She has a General diet ordered, pt has ate in the last 4 hours, pt has also had oral pain medications in the last 4 hours. Per [facility] pt needs to be NPO after midnight and no pain medicines for 4-6 hours prior to scan.        Erika Benavides RN  01/01/21 1389

## 2021-01-01 NOTE — PLAN OF CARE
Problem: Safety:  Goal: Free from accidental physical injury  Description: Free from accidental physical injury  Outcome: Ongoing  Goal: Free from intentional harm  Description: Free from intentional harm  Outcome: Ongoing     Problem: Daily Care:  Goal: Daily care needs are met  Description: Daily care needs are met  Outcome: Ongoing     Problem: Pain:  Goal: Patient's pain/discomfort is manageable  Description: Patient's pain/discomfort is manageable  Outcome: Ongoing

## 2021-01-01 NOTE — PROGRESS NOTES
Physical Therapy  DATE: 2021    NAME: Claudine Diaz  MRN: [id number]   : 1965    Patient not seen this date for Physical Therapy due to:  [] Blood transfusion in progress  [x] Cancel by RN (Cx PT as pt fully independent per Kristin Boucher)  [] Hemodialysis  []  Refusal by Patient   [] Spine Precautions   [] Strict Bedrest  [] Surgery  [] Testing      [] Other        [] PT being discontinued at this time. Patient independent. No further needs. [] PT being discontinued at this time as the patient has been transferred to hospice care. No further needs.     Farrukh Contreras, PT  13:12

## 2021-01-02 ENCOUNTER — ANESTHESIA EVENT (OUTPATIENT)
Dept: OPERATING ROOM | Age: 56
DRG: 419 | End: 2021-01-02
Payer: OTHER GOVERNMENT

## 2021-01-02 ENCOUNTER — ANESTHESIA (OUTPATIENT)
Dept: OPERATING ROOM | Age: 56
DRG: 419 | End: 2021-01-02
Payer: OTHER GOVERNMENT

## 2021-01-02 ENCOUNTER — APPOINTMENT (OUTPATIENT)
Dept: NUCLEAR MEDICINE | Age: 56
DRG: 419 | End: 2021-01-02
Payer: OTHER GOVERNMENT

## 2021-01-02 ENCOUNTER — APPOINTMENT (OUTPATIENT)
Dept: ULTRASOUND IMAGING | Age: 56
DRG: 419 | End: 2021-01-02
Payer: OTHER GOVERNMENT

## 2021-01-02 VITALS — OXYGEN SATURATION: 100 % | SYSTOLIC BLOOD PRESSURE: 126 MMHG | TEMPERATURE: 98.4 F | DIASTOLIC BLOOD PRESSURE: 60 MMHG

## 2021-01-02 LAB
ABSOLUTE EOS #: <0.03 K/UL (ref 0–0.44)
ABSOLUTE IMMATURE GRANULOCYTE: 0.06 K/UL (ref 0–0.3)
ABSOLUTE LYMPH #: 0.79 K/UL (ref 1.1–3.7)
ABSOLUTE MONO #: 0.71 K/UL (ref 0.1–1.2)
ALBUMIN SERPL-MCNC: 3.7 G/DL (ref 3.5–5.2)
ALBUMIN/GLOBULIN RATIO: ABNORMAL (ref 1–2.5)
ALP BLD-CCNC: 105 U/L (ref 35–104)
ALT SERPL-CCNC: 20 U/L (ref 5–33)
ANION GAP SERPL CALCULATED.3IONS-SCNC: 13 MMOL/L (ref 9–17)
AST SERPL-CCNC: 16 U/L
BASOPHILS # BLD: 0 % (ref 0–2)
BASOPHILS ABSOLUTE: <0.03 K/UL (ref 0–0.2)
BILIRUB SERPL-MCNC: 0.57 MG/DL (ref 0.3–1.2)
BILIRUBIN DIRECT: 0.22 MG/DL
BILIRUBIN, INDIRECT: 0.35 MG/DL (ref 0–1)
BUN BLDV-MCNC: 12 MG/DL (ref 6–20)
BUN/CREAT BLD: 15 (ref 9–20)
CALCIUM SERPL-MCNC: 9 MG/DL (ref 8.6–10.4)
CHLORIDE BLD-SCNC: 100 MMOL/L (ref 98–107)
CO2: 25 MMOL/L (ref 20–31)
CREAT SERPL-MCNC: 0.78 MG/DL (ref 0.5–0.9)
DIFFERENTIAL TYPE: ABNORMAL
EOSINOPHILS RELATIVE PERCENT: 0 % (ref 1–4)
GFR AFRICAN AMERICAN: >60 ML/MIN
GFR NON-AFRICAN AMERICAN: >60 ML/MIN
GFR SERPL CREATININE-BSD FRML MDRD: ABNORMAL ML/MIN/{1.73_M2}
GFR SERPL CREATININE-BSD FRML MDRD: ABNORMAL ML/MIN/{1.73_M2}
GLOBULIN: ABNORMAL G/DL (ref 1.5–3.8)
GLUCOSE BLD-MCNC: 119 MG/DL (ref 70–99)
HCT VFR BLD CALC: 36 % (ref 36.3–47.1)
HEMOGLOBIN: 11.5 G/DL (ref 11.9–15.1)
IMMATURE GRANULOCYTES: 1 %
LYMPHOCYTES # BLD: 7 % (ref 24–43)
MCH RBC QN AUTO: 28.8 PG (ref 25.2–33.5)
MCHC RBC AUTO-ENTMCNC: 31.9 G/DL (ref 28.4–34.8)
MCV RBC AUTO: 90 FL (ref 82.6–102.9)
MONOCYTES # BLD: 6 % (ref 3–12)
NRBC AUTOMATED: 0 PER 100 WBC
PDW BLD-RTO: 13.3 % (ref 11.8–14.4)
PLATELET # BLD: 187 K/UL (ref 138–453)
PLATELET ESTIMATE: ABNORMAL
PMV BLD AUTO: 8.8 FL (ref 8.1–13.5)
POTASSIUM SERPL-SCNC: 3.7 MMOL/L (ref 3.7–5.3)
RBC # BLD: 4 M/UL (ref 3.95–5.11)
RBC # BLD: ABNORMAL 10*6/UL
SEG NEUTROPHILS: 86 % (ref 36–65)
SEGMENTED NEUTROPHILS ABSOLUTE COUNT: 9.74 K/UL (ref 1.5–8.1)
SODIUM BLD-SCNC: 138 MMOL/L (ref 135–144)
TOTAL PROTEIN: 6.8 G/DL (ref 6.4–8.3)
TROPONIN INTERP: NORMAL
TROPONIN T: NORMAL NG/ML
TROPONIN, HIGH SENSITIVITY: 9 NG/L (ref 0–14)
WBC # BLD: 11.3 K/UL (ref 3.5–11.3)
WBC # BLD: ABNORMAL 10*3/UL

## 2021-01-02 PROCEDURE — 3430000000 HC RX DIAGNOSTIC RADIOPHARMACEUTICAL: Performed by: SURGERY

## 2021-01-02 PROCEDURE — 3700000001 HC ADD 15 MINUTES (ANESTHESIA): Performed by: SURGERY

## 2021-01-02 PROCEDURE — 6360000002 HC RX W HCPCS: Performed by: FAMILY MEDICINE

## 2021-01-02 PROCEDURE — 2580000003 HC RX 258: Performed by: HOSPITALIST

## 2021-01-02 PROCEDURE — 80076 HEPATIC FUNCTION PANEL: CPT

## 2021-01-02 PROCEDURE — 0FT44ZZ RESECTION OF GALLBLADDER, PERCUTANEOUS ENDOSCOPIC APPROACH: ICD-10-PCS | Performed by: INTERNAL MEDICINE

## 2021-01-02 PROCEDURE — 6360000002 HC RX W HCPCS: Performed by: STUDENT IN AN ORGANIZED HEALTH CARE EDUCATION/TRAINING PROGRAM

## 2021-01-02 PROCEDURE — 2580000003 HC RX 258: Performed by: FAMILY MEDICINE

## 2021-01-02 PROCEDURE — 88304 TISSUE EXAM BY PATHOLOGIST: CPT

## 2021-01-02 PROCEDURE — 2580000003 HC RX 258: Performed by: STUDENT IN AN ORGANIZED HEALTH CARE EDUCATION/TRAINING PROGRAM

## 2021-01-02 PROCEDURE — 3600000013 HC SURGERY LEVEL 3 ADDTL 15MIN: Performed by: SURGERY

## 2021-01-02 PROCEDURE — 3700000000 HC ANESTHESIA ATTENDED CARE: Performed by: SURGERY

## 2021-01-02 PROCEDURE — 36415 COLL VENOUS BLD VENIPUNCTURE: CPT

## 2021-01-02 PROCEDURE — 2780000010 HC IMPLANT OTHER: Performed by: SURGERY

## 2021-01-02 PROCEDURE — 2720000010 HC SURG SUPPLY STERILE: Performed by: SURGERY

## 2021-01-02 PROCEDURE — 7100000000 HC PACU RECOVERY - FIRST 15 MIN: Performed by: SURGERY

## 2021-01-02 PROCEDURE — 2500000003 HC RX 250 WO HCPCS: Performed by: SURGERY

## 2021-01-02 PROCEDURE — 1200000000 HC SEMI PRIVATE

## 2021-01-02 PROCEDURE — 80048 BASIC METABOLIC PNL TOTAL CA: CPT

## 2021-01-02 PROCEDURE — 6360000002 HC RX W HCPCS: Performed by: ANESTHESIOLOGY

## 2021-01-02 PROCEDURE — 84484 ASSAY OF TROPONIN QUANT: CPT

## 2021-01-02 PROCEDURE — 7100000001 HC PACU RECOVERY - ADDTL 15 MIN: Performed by: SURGERY

## 2021-01-02 PROCEDURE — 85025 COMPLETE CBC W/AUTO DIFF WBC: CPT

## 2021-01-02 PROCEDURE — 2500000003 HC RX 250 WO HCPCS: Performed by: ANESTHESIOLOGY

## 2021-01-02 PROCEDURE — 3600000003 HC SURGERY LEVEL 3 BASE: Performed by: SURGERY

## 2021-01-02 PROCEDURE — 2580000003 HC RX 258: Performed by: SURGERY

## 2021-01-02 PROCEDURE — 6360000002 HC RX W HCPCS: Performed by: HOSPITALIST

## 2021-01-02 PROCEDURE — 6370000000 HC RX 637 (ALT 250 FOR IP): Performed by: HOSPITALIST

## 2021-01-02 PROCEDURE — A9537 TC99M MEBROFENIN: HCPCS | Performed by: SURGERY

## 2021-01-02 PROCEDURE — 76705 ECHO EXAM OF ABDOMEN: CPT

## 2021-01-02 PROCEDURE — 78227 HEPATOBIL SYST IMAGE W/DRUG: CPT

## 2021-01-02 PROCEDURE — 2709999900 HC NON-CHARGEABLE SUPPLY: Performed by: SURGERY

## 2021-01-02 DEVICE — Z DUP USE 2641840 CLIP INT L POLYMER LOK LIG HEM O LOK: Type: IMPLANTABLE DEVICE | Site: ABDOMEN | Status: FUNCTIONAL

## 2021-01-02 RX ORDER — HYDROMORPHONE HCL 110MG/55ML
0.25 PATIENT CONTROLLED ANALGESIA SYRINGE INTRAVENOUS EVERY 5 MIN PRN
Status: DISCONTINUED | OUTPATIENT
Start: 2021-01-02 | End: 2021-01-02 | Stop reason: HOSPADM

## 2021-01-02 RX ORDER — PROPOFOL 10 MG/ML
INJECTION, EMULSION INTRAVENOUS PRN
Status: DISCONTINUED | OUTPATIENT
Start: 2021-01-02 | End: 2021-01-02 | Stop reason: SDUPTHER

## 2021-01-02 RX ORDER — SODIUM CHLORIDE 9 MG/ML
INJECTION, SOLUTION INTRAVENOUS CONTINUOUS
Status: DISCONTINUED | OUTPATIENT
Start: 2021-01-02 | End: 2021-01-03 | Stop reason: HOSPADM

## 2021-01-02 RX ORDER — SUCCINYLCHOLINE/SOD CL,ISO/PF 100 MG/5ML
SYRINGE (ML) INTRAVENOUS PRN
Status: DISCONTINUED | OUTPATIENT
Start: 2021-01-02 | End: 2021-01-02 | Stop reason: SDUPTHER

## 2021-01-02 RX ORDER — ONDANSETRON 2 MG/ML
INJECTION INTRAMUSCULAR; INTRAVENOUS PRN
Status: DISCONTINUED | OUTPATIENT
Start: 2021-01-02 | End: 2021-01-02 | Stop reason: SDUPTHER

## 2021-01-02 RX ORDER — ROCURONIUM BROMIDE 10 MG/ML
INJECTION, SOLUTION INTRAVENOUS PRN
Status: DISCONTINUED | OUTPATIENT
Start: 2021-01-02 | End: 2021-01-02 | Stop reason: SDUPTHER

## 2021-01-02 RX ORDER — FENTANYL CITRATE 50 UG/ML
25 INJECTION, SOLUTION INTRAMUSCULAR; INTRAVENOUS EVERY 5 MIN PRN
Status: DISCONTINUED | OUTPATIENT
Start: 2021-01-02 | End: 2021-01-02 | Stop reason: HOSPADM

## 2021-01-02 RX ORDER — ONDANSETRON 2 MG/ML
4 INJECTION INTRAMUSCULAR; INTRAVENOUS
Status: DISCONTINUED | OUTPATIENT
Start: 2021-01-02 | End: 2021-01-02 | Stop reason: HOSPADM

## 2021-01-02 RX ORDER — MAGNESIUM HYDROXIDE 1200 MG/15ML
LIQUID ORAL CONTINUOUS PRN
Status: COMPLETED | OUTPATIENT
Start: 2021-01-02 | End: 2021-01-02

## 2021-01-02 RX ORDER — KETOROLAC TROMETHAMINE 30 MG/ML
INJECTION, SOLUTION INTRAMUSCULAR; INTRAVENOUS PRN
Status: DISCONTINUED | OUTPATIENT
Start: 2021-01-02 | End: 2021-01-02 | Stop reason: SDUPTHER

## 2021-01-02 RX ORDER — DOCUSATE SODIUM 100 MG/1
100 CAPSULE, LIQUID FILLED ORAL DAILY PRN
Qty: 30 CAPSULE | Refills: 0 | Status: SHIPPED | OUTPATIENT
Start: 2021-01-02

## 2021-01-02 RX ORDER — HYDROMORPHONE HCL 110MG/55ML
PATIENT CONTROLLED ANALGESIA SYRINGE INTRAVENOUS PRN
Status: DISCONTINUED | OUTPATIENT
Start: 2021-01-02 | End: 2021-01-02 | Stop reason: SDUPTHER

## 2021-01-02 RX ORDER — ONDANSETRON 4 MG/1
4 TABLET, FILM COATED ORAL EVERY 8 HOURS PRN
Qty: 30 TABLET | Refills: 0 | Status: SHIPPED | OUTPATIENT
Start: 2021-01-02

## 2021-01-02 RX ORDER — HYDROCODONE BITARTRATE AND ACETAMINOPHEN 5; 325 MG/1; MG/1
1 TABLET ORAL EVERY 6 HOURS PRN
Qty: 28 TABLET | Refills: 0 | Status: SHIPPED | OUTPATIENT
Start: 2021-01-02 | End: 2021-01-09

## 2021-01-02 RX ORDER — PHENYLEPHRINE HCL IN 0.9% NACL 1 MG/10 ML
SYRINGE (ML) INTRAVENOUS PRN
Status: DISCONTINUED | OUTPATIENT
Start: 2021-01-02 | End: 2021-01-02 | Stop reason: SDUPTHER

## 2021-01-02 RX ORDER — FENTANYL CITRATE 50 UG/ML
INJECTION, SOLUTION INTRAMUSCULAR; INTRAVENOUS PRN
Status: DISCONTINUED | OUTPATIENT
Start: 2021-01-02 | End: 2021-01-02 | Stop reason: SDUPTHER

## 2021-01-02 RX ORDER — DEXAMETHASONE SODIUM PHOSPHATE 10 MG/ML
INJECTION, SOLUTION INTRAMUSCULAR; INTRAVENOUS PRN
Status: DISCONTINUED | OUTPATIENT
Start: 2021-01-02 | End: 2021-01-02 | Stop reason: SDUPTHER

## 2021-01-02 RX ORDER — LIDOCAINE HYDROCHLORIDE 20 MG/ML
INJECTION, SOLUTION EPIDURAL; INFILTRATION; INTRACAUDAL; PERINEURAL PRN
Status: DISCONTINUED | OUTPATIENT
Start: 2021-01-02 | End: 2021-01-02 | Stop reason: SDUPTHER

## 2021-01-02 RX ADMIN — Medication 50 MCG: at 13:20

## 2021-01-02 RX ADMIN — MORPHINE SULFATE 1 MG: 2 INJECTION, SOLUTION INTRAMUSCULAR; INTRAVENOUS at 10:27

## 2021-01-02 RX ADMIN — ROCURONIUM BROMIDE 20 MG: 10 INJECTION, SOLUTION INTRAVENOUS at 13:29

## 2021-01-02 RX ADMIN — MORPHINE SULFATE 1 MG: 2 INJECTION, SOLUTION INTRAMUSCULAR; INTRAVENOUS at 21:40

## 2021-01-02 RX ADMIN — HEPARIN SODIUM 5000 UNITS: 5000 INJECTION INTRAVENOUS; SUBCUTANEOUS at 21:40

## 2021-01-02 RX ADMIN — Medication 5 MILLICURIE: at 08:15

## 2021-01-02 RX ADMIN — SUGAMMADEX 400 MG: 100 INJECTION, SOLUTION INTRAVENOUS at 13:55

## 2021-01-02 RX ADMIN — PIPERACILLIN AND TAZOBACTAM 3.38 G: 3; .375 INJECTION, POWDER, LYOPHILIZED, FOR SOLUTION INTRAVENOUS at 18:17

## 2021-01-02 RX ADMIN — HEPARIN SODIUM 5000 UNITS: 5000 INJECTION INTRAVENOUS; SUBCUTANEOUS at 00:00

## 2021-01-02 RX ADMIN — ONDANSETRON 4 MG: 2 INJECTION, SOLUTION INTRAMUSCULAR; INTRAVENOUS at 13:31

## 2021-01-02 RX ADMIN — Medication 100 MG: at 13:20

## 2021-01-02 RX ADMIN — Medication 100 MCG: at 13:28

## 2021-01-02 RX ADMIN — SODIUM CHLORIDE: 9 INJECTION, SOLUTION INTRAVENOUS at 15:41

## 2021-01-02 RX ADMIN — Medication 10 ML: at 10:28

## 2021-01-02 RX ADMIN — HYDROCODONE BITARTRATE AND ACETAMINOPHEN 1 TABLET: 5; 325 TABLET ORAL at 18:15

## 2021-01-02 RX ADMIN — Medication 25 MCG: at 13:41

## 2021-01-02 RX ADMIN — MORPHINE SULFATE 1 MG: 2 INJECTION, SOLUTION INTRAMUSCULAR; INTRAVENOUS at 00:00

## 2021-01-02 RX ADMIN — DEXAMETHASONE SODIUM PHOSPHATE 10 MG: 10 INJECTION INTRAMUSCULAR; INTRAVENOUS at 13:31

## 2021-01-02 RX ADMIN — Medication 25 MCG: at 13:37

## 2021-01-02 RX ADMIN — PIPERACILLIN AND TAZOBACTAM 3.38 G: 3; .375 INJECTION, POWDER, LYOPHILIZED, FOR SOLUTION INTRAVENOUS at 10:27

## 2021-01-02 RX ADMIN — MORPHINE SULFATE 1 MG: 2 INJECTION, SOLUTION INTRAMUSCULAR; INTRAVENOUS at 17:08

## 2021-01-02 RX ADMIN — PIPERACILLIN AND TAZOBACTAM 3.38 G: 3; .375 INJECTION, POWDER, LYOPHILIZED, FOR SOLUTION INTRAVENOUS at 00:00

## 2021-01-02 RX ADMIN — FAMOTIDINE 20 MG: 20 TABLET, FILM COATED ORAL at 21:43

## 2021-01-02 RX ADMIN — SODIUM CHLORIDE: 9 INJECTION, SOLUTION INTRAVENOUS at 13:13

## 2021-01-02 RX ADMIN — LIDOCAINE HYDROCHLORIDE 5 ML: 20 INJECTION, SOLUTION EPIDURAL; INFILTRATION; INTRACAUDAL; PERINEURAL at 13:20

## 2021-01-02 RX ADMIN — PROMETHAZINE HYDROCHLORIDE 12.5 MG: 12.5 TABLET ORAL at 00:09

## 2021-01-02 RX ADMIN — KETOROLAC TROMETHAMINE 30 MG: 30 INJECTION, SOLUTION INTRAMUSCULAR; INTRAVENOUS at 13:53

## 2021-01-02 RX ADMIN — PROPOFOL 150 MG: 10 INJECTION, EMULSION INTRAVENOUS at 13:20

## 2021-01-02 RX ADMIN — HYDROMORPHONE HYDROCHLORIDE 0.5 MG: 2 INJECTION INTRAMUSCULAR; INTRAVENOUS; SUBCUTANEOUS at 13:45

## 2021-01-02 ASSESSMENT — PULMONARY FUNCTION TESTS
PIF_VALUE: 16
PIF_VALUE: 19
PIF_VALUE: 0
PIF_VALUE: 0
PIF_VALUE: 19
PIF_VALUE: 0
PIF_VALUE: 25
PIF_VALUE: 21
PIF_VALUE: 22
PIF_VALUE: 25
PIF_VALUE: 15
PIF_VALUE: 0
PIF_VALUE: 15
PIF_VALUE: 3
PIF_VALUE: 23
PIF_VALUE: 2
PIF_VALUE: 19
PIF_VALUE: 23
PIF_VALUE: 14
PIF_VALUE: 19
PIF_VALUE: 23
PIF_VALUE: 22
PIF_VALUE: 17
PIF_VALUE: 18
PIF_VALUE: 24
PIF_VALUE: 24
PIF_VALUE: 22
PIF_VALUE: 17
PIF_VALUE: 23
PIF_VALUE: 15
PIF_VALUE: 17
PIF_VALUE: 23
PIF_VALUE: 25
PIF_VALUE: 2

## 2021-01-02 ASSESSMENT — PAIN DESCRIPTION - PROGRESSION
CLINICAL_PROGRESSION: NOT CHANGED

## 2021-01-02 ASSESSMENT — PAIN DESCRIPTION - DESCRIPTORS
DESCRIPTORS: ACHING;PRESSURE
DESCRIPTORS: SHARP;DISCOMFORT;ACHING

## 2021-01-02 ASSESSMENT — PAIN DESCRIPTION - FREQUENCY
FREQUENCY: CONTINUOUS
FREQUENCY: CONTINUOUS

## 2021-01-02 ASSESSMENT — PAIN SCALES - GENERAL
PAINLEVEL_OUTOF10: 8
PAINLEVEL_OUTOF10: 7
PAINLEVEL_OUTOF10: 0

## 2021-01-02 ASSESSMENT — PAIN DESCRIPTION - LOCATION
LOCATION: ABDOMEN
LOCATION: ABDOMEN

## 2021-01-02 ASSESSMENT — PAIN DESCRIPTION - ORIENTATION: ORIENTATION: UPPER;RIGHT;LEFT

## 2021-01-02 ASSESSMENT — PAIN - FUNCTIONAL ASSESSMENT: PAIN_FUNCTIONAL_ASSESSMENT: ACTIVITIES ARE NOT PREVENTED

## 2021-01-02 ASSESSMENT — PAIN DESCRIPTION - ONSET: ONSET: ON-GOING

## 2021-01-02 NOTE — OP NOTE
Operative Note      Patient: Taina Carvajal  YOB: 1965  MRN: [id number]    Date of Procedure: 1/2/2021    Pre-Op Diagnosis: ACUTE CHOLECYSTITIS    Post-Op Diagnosis: Same       Procedure(s):  CHOLECYSTECTOMY LAPAROSCOPIC    Surgeon(s):  Zane Sy MD    Assistant:   Surgical Assistant: Janice Murillo  Resident: Latha Ko DO    Anesthesia: General    Estimated Blood Loss (mL): 20 ml    Complications: None    Specimens:   ID Type Source Tests Collected by Time Destination   A : GALLBLADDER Tissue Gallbladder SURGICAL PATHOLOGY Zane Sy MD 1/2/2021 1342        Implants:  Implant Name Type Inv. Item Serial No.  Lot No. LRB No. Used Action   CLIP INT L POLYMER SELVIN LIG HEM O SELVIN  CLIP INT L POLYMER SELVIN LIG HEM O SELVIN  TELEFLEX [id number] N/A 5 Implanted         Drains: * No LDAs found *    Findings: Acute cholecystitis    Specimens:  Gallbladder with contents    Disposition:  To Recovery Room in stable condition    Indications: This is a very pleasant 60 y.o. female who presented with the complaints of right upper quadrant abdominal pain and suspected acute cholecystitis. The risks and benefits of laparoscopic cholecystectomy were discussed with the patient including bleeding, infection, bile leak, bowel injury, common bile duct injury, retained common bile duct stone, postcholecystectomy syndrome, risks of anesthesia, incisional or port site hernias, wound healing problems, and postoperative pain. Procedure in detail:  After verbal and written consent,the patient was brought to the operating room. After appropriate monitoring, general anesthesia was administered. A timeout was performed with the staff in the room confirming both the patient and the procedure to be performed. The procedure was begun with a sterile prep and drape of the patient's abdomen. A transverse epigastric incision was made with a #15 blade scalpel and dissection was carried down through the subcutaneous tissue with cautery. The midline fascia was identified and opened vertically. Interrupted Vicryl sutures were used for retention sutures in the fascia. The peritoneal cavity was then entered bluntly. A blunt 12 mm balloon tipped trocar was inserted into the peritoneal cavity and pneumoperitoneum was established. A 5 millimeter laparoscope was then inserted into the peritoneal cavity and the peritoneum was inspected. Three additional blunt trochars were then inserted into the peritoneal cavity under direct visualization. This included one 5 mm trocar below the umbilicus, and 2 additional 5 mm trochars in the right upper quadrant. The gallbladder was identified in the right upper quadrant and peritoneal adhesions were taken down. The neck of the gallbladder was exposed. The cystic duct and cystic artery were identified and dissected circumferentially. The critical view was obtained to assure that there was adequate visualization of the cystic duct and cystic artery following dissection. Clips were placed on both the cystic duct and cystic artery. The cystic duct and cystic artery were then divided with Endo Estela. Using cautery, the gallbladder was amputated from the gallbladder fossa , obtaining hemostasis throughout the dissection. Once the gallbladder had been amputated from the gallbladder fossa, it was deposited within an EndoCatch bag and retrieved through the epigastric port.   The gallbladder was then passed off the field for permanent pathologic evaluation. The epigastric trocar was then reinserted back into the peritoneal cavity and pneumoperitoneum was reestablished. The right upper quadrant was inspected for hemostasis and once hemostasis was assured, trochars were removed and pneumoperitoneum was evacuated. The epigastric fascial defect was closed with an interrupted figure-of-eight PDS suture. The skin was closed at all trocar sites with running Monocryl sutures. Dermabond was then applied to the wounds. Local anesthesia was infiltrated into the skin and subcutaneous tissue as a field block. The patient was awakened from anesthesia and transported to the recovery room in stable condition. The sponge, lap, needle, and instrument count were correct at the end of the case. There were no immediate complications.       Electronically signed by Lupe Vasquez MD on 1/2/2021 at 1:54 PM

## 2021-01-02 NOTE — PROGRESS NOTES
Neck: neck supple and non tender without mass, no thyromegaly or thyroid nodules, no cervical lymphadenopathy   Pulmonary/Chest: clear to auscultation bilaterally- no wheezes, rales or rhonchi, normal air movement, no respiratory distress  Cardiovascular: normal rate, normal S1 and S2, no gallops, intact distal pulses and no carotid bruits  Abdomen: Soft, upper abdominal discomfort with deep palpation, seems improved over yesterday, no generalized peritoneal signs, no organomegaly  Extremities: no cyanosis and no clubbing  Musculoskeletal: normal range of motion, no joint swelling, deformity or tenderness  Neurologic: no cranial nerve deficit and speech normal    I/O last 3 completed shifts: In: 1000 [IV Piggyback:1000]  Out: 475 [Urine:475]    Hemoglobin   Date Value Ref Range Status   01/02/2021 11.5 (L) 11.9 - 15.1 g/dL Final     Hematocrit   Date Value Ref Range Status   01/02/2021 36.0 (L) 36.3 - 47.1 % Final     WBC   Date Value Ref Range Status   01/02/2021 11.3 3.5 - 11.3 k/uL Final     Sodium   Date Value Ref Range Status   01/02/2021 138 135 - 144 mmol/L Final     Potassium   Date Value Ref Range Status   01/02/2021 3.7 3.7 - 5.3 mmol/L Final     Chloride   Date Value Ref Range Status   01/02/2021 100 98 - 107 mmol/L Final     CO2   Date Value Ref Range Status   01/02/2021 25 20 - 31 mmol/L Final     Glucose   Date Value Ref Range Status   01/02/2021 119 (H) 70 - 99 mg/dL Final       Assessment/Plan: Suspected cholecystitis. This appears to have been going on for some time. We do have an ultrasound and HIDA scan pending. If her ultrasound does not look too ominous, and her HIDA scan confirms cholecystitis, consideration of lap lukasz versus cholecystostomy tube with antibiotics would be the plan of care. Further recommendations pending. Keep her n.p.o. for the time being.         Melissa Neil MD  1/2/2021 7:59 AM

## 2021-01-02 NOTE — FLOWSHEET NOTE
Patient was out of the room. Spouse was present. States patient was in surgery. No major needs.  leaves prayer card for possible follow up.     01/02/21 1537   Encounter Summary   Services provided to: Patient not available   Referral/Consult From: [facility]   Support System Spouse   Continue Visiting   (1-2-21)   Complexity of Encounter Low   Length of Encounter 15 minutes   Spiritual Assessment Completed Yes   Routine   Type Initial   Assessment Approachable;Calm   Intervention Explored feelings, thoughts, concerns; Discussed illness/injury and it's impact   Outcome Expressed gratitude;Receptive

## 2021-01-02 NOTE — PROGRESS NOTES
Progress note  [facility]    Adult Hospitalist      Name: Sarah Esteban  MRN: [id number]     Acct: [de-identified]  Room: 2026/2026-01    Admit Date: 1/1/2021  6:24 AM  PCP: Kavitha Andrew, DO    Primary Problem  Active Problems:    Cholecystitis  Resolved Problems:    * No resolved hospital problems. *        Assesment:     · Acute cholecystitis  · Intractable abdominal pain secondary to above  · Leukocytosis  · Hypokalemia  · Hyperlipidemia  · Essential hypertension  · Depression with anxiety        Plan:     · Admit patient to intermediate floor  · Oxygen to keep SPO2 more than 90%  · Telemetry  · Check vital signs closely  · CBC BMP daily  · Check liver enzymes  · Liver ultrasound  · HIDA scan  · IV Zosyn  · IV fluid hydration  · Pain control  · Consult general surgery, plan for surgery later today  · Continue atorvastatin  · Continue micardis  · Continue Prozac  · Continue to monitor  · Further recommendation to follow  · DVT and GI prophylaxis.         Chief Complaint:     Chief Complaint   Patient presents with    Abdominal Pain         History of Present Illness:      Sarah Esteban is a 60 y.o.  female who presents with Abdominal Pain   patient seen examined bedside, patient denies any chest pain no nausea vomiting diarrhea  No headache no palpitation  No focal neurological deficit  Patient is still has abdominal Pain            HPI  57-year-old female has been admitted to emergency room, patient came to the ER with a complaint of having abdominal pain, patient started having some abdominal pain last evening, further patient abdominal pain is located in left lower quadrant initially, further it relocated/suggested to right upper quadrant, further patient has some nausea but no vomiting, denies any fever or chills, initial testing in the emergency room included a CT abdomen which is compatible with acute cholecystitis, uterine fibroids, WBC count is elevated at 14,000, admitted for further management I have personally reviewed the past medical history, past surgical history, medications, social history, and family history, and summarized in the note. Review of Systems:     All 10 point system is reviewed and negative otherwise mentioned in HPI. Past Medical History:     Past Medical History:   Diagnosis Date    Hypertension         Past Surgical History:     History reviewed. No pertinent surgical history. Medications Prior to Admission:       Prior to Admission medications    Medication Sig Start Date End Date Taking? Authorizing Provider   aspirin 81 MG chewable tablet Take 81 mg by mouth daily   Yes Historical Provider, MD   atorvastatin (LIPITOR) 40 MG tablet Take 40 mg by mouth daily   Yes Historical Provider, MD   Coenzyme Q10 (COQ-10) 100 MG CAPS Take by mouth   Yes Historical Provider, MD   dicyclomine (BENTYL) 10 MG capsule Take 10 mg by mouth 4 times daily (before meals and nightly)    Yes Historical Provider, MD   Flaxseed Linseed, (FLAX SEEDS PO) Take by mouth   Yes Historical Provider, MD   Shelda Rue 500 MG CAPS Take by mouth   Yes Historical Provider, MD   linaCLOtide (LINZESS) 72 MCG CAPS capsule Take 72 mcg by mouth every morning (before breakfast)    Yes Historical Provider, MD   niacin 100 MG tablet Take 100 mg by mouth daily (with breakfast)   Yes Historical Provider, MD   FLUoxetine (PROZAC) 20 MG capsule Take 20 mg by mouth daily   Yes Historical Provider, MD   telmisartan-hydrochlorothiazide (MICARDIS HCT) 40-12.5 MG per tablet Take 1 tablet by mouth daily   Yes Historical Provider, MD   ondansetron (ZOFRAN-ODT) 4 MG disintegrating tablet Take 4 mg by mouth every 8 hours as needed for Nausea or Vomiting   Yes Historical Provider, MD   Cholecalciferol (VITAMIN D3) 125 MCG (5000 UT) TABS Take 2 tablets by mouth daily   Yes Historical Provider, MD        Allergies:       Patient has no known allergies.     Social History: Tobacco:    reports that she has never smoked. She has never used smokeless tobacco.  Alcohol:      has no history on file for alcohol. Drug Use:  has no history on file for drug. Family History:     History reviewed. No pertinent family history.       Physical Exam:     Vitals:  BP (!) 131/52   Pulse 86   Temp 98.3 °F (36.8 °C) (Oral)   Resp 18   Wt 157 lb 4.8 oz (71.4 kg)   SpO2 90%   Temp (24hrs), Av.4 °F (36.9 °C), Min:97.7 °F (36.5 °C), Max:99.3 °F (37.4 °C)          General appearance - alert, well appearing, and in no acute distress  Mental status - oriented to person, place, and time with normal affect  Head - normocephalic and atraumatic  Eyes - pupils equal and reactive, extraocular eye movements intact, conjunctiva clear  Ears - hearing appears to be intact  Nose - no drainage noted  Mouth - mucous membranes moist  Neck - supple, no carotid bruits, thyroid not palpable  Chest - clear to auscultation, normal effort  Heart - normal rate, regular rhythm, no murmur  Abdomen - soft, right upper quadrant tenderness, nondistended, bowel sounds present all four quadrants, no masses, hepatomegaly or splenomegaly  Neurological - normal speech, no focal findings or movement disorder noted, cranial nerves II through XII grossly intact  Extremities - peripheral pulses palpable, no pedal edema or calf pain with palpation  Skin - no gross lesions, rashes, or induration noted        Data:     Labs:    Hematology:  Recent Labs     21  0645 21  0343   WBC 14.0* 11.3   RBC 4.45 4.00   HGB 12.9 11.5*   HCT 40.3 36.0*   MCV 90.6 90.0   MCH 29.0 28.8   MCHC 32.0 31.9   RDW 13.2 13.3    187   MPV 9.1 8.8     Chemistry:  Recent Labs     21  0645 21  1129 21  1939 21  0343     --   --  138   K 3.4*  --   --  3.7     --   --  100   CO2 26  --   --  25   GLUCOSE 122*  --   --  119*   BUN 18  --   --  12   CREATININE 1.19*  --   --  0.78   ANIONGAP 11  --   --  13 LABGLOM 47*  --   --  >60   GFRAA 57*  --   --  >60   CALCIUM 9.9  --   --  9.0   TROPHS  --  7 7 9     Recent Labs     01/01/21  0645 01/02/21  0343   PROT 7.9 6.8   LABALBU 4.2 3.7   AST 16 16   ALT 15 20   ALKPHOS 149* 105*   BILITOT 0.40 0.57   BILIDIR 0.13 0.22   AMYLASE 51  --    LIPASE 85*  --        No results found for: INR, PROTIME    Lab Results   Component Value Date/Time    SPECIAL L AC 9 ML 01/01/2021 08:56 AM     Lab Results   Component Value Date/Time    CULTURE NO GROWTH 23 HOURS 01/01/2021 08:56 AM       No results found for: POCPH, PHART, PH, POCPCO2, JXF4EFH, PCO2, POCPO2, PO2ART, PO2, POCHCO3, LLX4HLJ, HCO3, NBEA, PBEA, BEART, BE, THGBART, THB, CUQ9JNA, HMZY2GJH, D8PJCNOR, O2SAT, FIO2    Radiology:    Ct Abdomen Pelvis W Iv Contrast    Result Date: 1/1/2021  1. CT findings most compatible with acute cholecystitis. 2.  Mild wall thickening at the proximal duodenum, likely reactive. 3.  Uterine fibroids. All radiological studies reviewed                Code Status:  Full Code    Electronically signed by Nery Mahajan MD on 1/2/2021 at 12:47 PM     Copy sent to Dr. Femi Collier DO    This note was created with the assistance of a speech-recognition program.  Although the intention is to generate a document that actually reflects the content of the visit, no guarantees can be provided that every mistake has been identified and corrected by editing. Note was updated later by me after  physical examination and  completion of the assessment.

## 2021-01-02 NOTE — PROGRESS NOTES
Occupational Therapy      DATE: 2021    NAME: Sarah Esteban  MRN: [id number]   : 1965    Patient not seen this date for Occupational Therapy due to:  [] Blood transfusion in progress  [] Cancel by RN  [] Hemodialysis  []  Refusal by Patient   [] Spine Precautions   [] Strict Bedrest  [x] Surgery - Per CAROLINA Brown pt is in sx for cholecystectomy laparoscopic   [] Testing      [] Other        [] OT being discontinued at this time. Patient independent. No further needs. [] OT being discontinued at this time as the patient has been transferred to hospice care. No further needs.     Tyler Balderas, OT

## 2021-01-02 NOTE — ANESTHESIA PRE PROCEDURE
Department of Anesthesiology  Preprocedure Note       Name:  Thuy Mcdaniel   Age:  60 y.o.  :  1965                                          MRN:  [id number]         Date:  2021      Surgeon: Oscar Rivers):  Shreya Cobb MD    Procedure: Procedure(s):  CHOLECYSTECTOMY LAPAROSCOPIC    Medications prior to admission:   Prior to Admission medications    Medication Sig Start Date End Date Taking?  Authorizing Provider   aspirin 81 MG chewable tablet Take 81 mg by mouth daily   Yes Historical Provider, MD   atorvastatin (LIPITOR) 40 MG tablet Take 40 mg by mouth daily   Yes Historical Provider, MD   Coenzyme Q10 (COQ-10) 100 MG CAPS Take by mouth   Yes Historical Provider, MD   dicyclomine (BENTYL) 10 MG capsule Take 10 mg by mouth 4 times daily (before meals and nightly)    Yes Historical Provider, MD   Flaxseed, Linseed, (FLAX SEEDS PO) Take by mouth   Yes Historical Provider, MD Cain Ari Oil 500 MG CAPS Take by mouth   Yes Historical Provider, MD   linaCLOtide (LINZESS) 72 MCG CAPS capsule Take 72 mcg by mouth every morning (before breakfast)    Yes Historical Provider, MD   niacin 100 MG tablet Take 100 mg by mouth daily (with breakfast)   Yes Historical Provider, MD   FLUoxetine (PROZAC) 20 MG capsule Take 20 mg by mouth daily   Yes Historical Provider, MD   telmisartan-hydrochlorothiazide (MICARDIS HCT) 40-12.5 MG per tablet Take 1 tablet by mouth daily   Yes Historical Provider, MD   ondansetron (ZOFRAN-ODT) 4 MG disintegrating tablet Take 4 mg by mouth every 8 hours as needed for Nausea or Vomiting   Yes Historical Provider, MD   Cholecalciferol (VITAMIN D3) 125 MCG (5000 UT) TABS Take 2 tablets by mouth daily   Yes Historical Provider, MD       Current medications:    Current Facility-Administered Medications   Medication Dose Route Frequency Provider Last Rate Last Admin    [MAR Hold] 0.9 % sodium chloride infusion   Intravenous Continuous Urbano Pizano MD  morphine (PF) injection 1 mg  1 mg Intravenous Q4H PRN Genesis John MD   1 mg at 01/02/21 1027    HYDROcodone-acetaminophen (NORCO) 5-325 MG per tablet 1 tablet  1 tablet Oral Q6H PRN Genesis John MD   1 tablet at 01/01/21 1316    sodium chloride flush 0.9 % injection 10 mL  10 mL Intravenous 2 times per day Genesis John MD   10 mL at 01/02/21 1028    sodium chloride flush 0.9 % injection 10 mL  10 mL Intravenous PRN Genesis John MD        potassium chloride (KLOR-CON M) extended release tablet 40 mEq  40 mEq Oral PRN Genesis John MD   40 mEq at 01/01/21 1658    Or    potassium bicarb-citric acid (EFFER-K) effervescent tablet 40 mEq  40 mEq Oral PRN Genesis John MD        Or    potassium chloride 10 mEq/100 mL IVPB (Peripheral Line)  10 mEq Intravenous PRN Genesis John MD        promethazine (PHENERGAN) tablet 12.5 mg  12.5 mg Oral Q6H PRN Genesis John MD   12.5 mg at 01/02/21 0009    Or    ondansetron (ZOFRAN) injection 4 mg  4 mg Intravenous Q6H PRHIGINIO John MD   4 mg at 01/01/21 2001    senna (SENOKOT) tablet 8.6 mg  1 tablet Oral BID PRHIGINIO John MD        famotidine (PEPCID) tablet 20 mg  20 mg Oral BID Genesis John MD   20 mg at 01/01/21 1507    acetaminophen (TYLENOL) tablet 650 mg  650 mg Oral Q6H PRN Genesis John MD        Or    acetaminophen (TYLENOL) suppository 650 mg  650 mg Rectal Q6H PRN Genesis John MD        heparin (porcine) injection 5,000 Units  5,000 Units Subcutaneous BID Genesis John MD   5,000 Units at 01/02/21 0000    magnesium sulfate 1 g in dextrose 5% 100 mL IVPB  1 g Intravenous PRN Genesis John MD        [facility] Hold] piperacillin-tazobactam (ZOSYN) 3.375 g in dextrose 5 % 50 mL IVPB (mini-bag)  3.375 g Intravenous Q8H Juvencio Gloria MD 12.5 mL/hr at 01/02/21 1027 3.375 g at 01/02/21 1027    [MAR Hold] atorvastatin (LIPITOR) tablet 40 mg  40 mg Oral Daily Ann Perez MD  [MAR Hold] FLUoxetine (PROZAC) capsule 20 mg  20 mg Oral Daily Juvencio Gloria MD        [MAR Hold] linaclotide (LINZESS) capsule 145 mcg  145 mcg Oral QAM AC Juvencio Gloria MD        [MAR Hold] losartan (COZAAR) tablet 50 mg  50 mg Oral Daily Juvencio Gloria MD   50 mg at 01/01/21 1300    And    [MAR Hold] hydroCHLOROthiazide (MICROZIDE) capsule 12.5 mg  12.5 mg Oral Daily Juvencio Gloria MD   12.5 mg at 01/01/21 1300     Facility-Administered Medications Ordered in Other Encounters   Medication Dose Route Frequency Provider Last Rate Last Admin    phenylephrine (ADRIAN-SYNEPHRINE) 1 MG/10ML prefilled syringe    PRN Damian Jett MD   100 mcg at 01/02/21 1328    rocuronium (ZEMURON) injection    PRN Damian Jett MD   20 mg at 01/02/21 1329    fentaNYL (SUBLIMAZE) injection    PRN Damian Jett MD   25 mcg at 01/02/21 1337    lidocaine PF 2 % injection    PRN Damian Jett MD   5 mL at 01/02/21 1320    propofol injection    PRN Damian Jett MD   150 mg at 01/02/21 1320    succinylcholine chloride (ANECTINE) injection    PRN Damian Jett MD   100 mg at 01/02/21 1320    dexamethasone (PF) (DECADRON) injection    PRN Damian Jett MD   10 mg at 01/02/21 1331    ondansetron (Cathy Wilson) injection    PRN Damian Jett MD   4 mg at 01/02/21 1331       Allergies:  No Known Allergies    Problem List:    Patient Active Problem List   Diagnosis Code    Cholecystitis K81.9       Past Medical History:        Diagnosis Date    Hypertension        Past Surgical History:  History reviewed. No pertinent surgical history.     Social History:    Social History     Tobacco Use    Smoking status: Never Smoker    Smokeless tobacco: Never Used   Substance Use Topics    Alcohol use: Not on file                                Counseling given: Not Answered      Vital Signs (Current):   Vitals:    01/01/21 1719 01/01/21 2041 01/02/21 0141 01/02/21 0755   BP: (!) 118/54 (!) 135/56  (!) 131/52   Pulse: 67 79  86 Resp: 14 18     Temp: 98.4 °F (36.9 °C) 99.3 °F (37.4 °C)  98.3 °F (36.8 °C)   TempSrc: Oral Oral  Oral   SpO2: 98% 97%  90%   Weight:   157 lb 4.8 oz (71.4 kg)                                               BP Readings from Last 3 Encounters:   01/02/21 (!) 131/52   01/02/21 130/73       NPO Status:                                                                                 BMI:   Wt Readings from Last 3 Encounters:   01/02/21 157 lb 4.8 oz (71.4 kg)     There is no height or weight on file to calculate BMI.    CBC:   Lab Results   Component Value Date    WBC 11.3 01/02/2021    RBC 4.00 01/02/2021    HGB 11.5 01/02/2021    HCT 36.0 01/02/2021    MCV 90.0 01/02/2021    RDW 13.3 01/02/2021     01/02/2021       CMP:   Lab Results   Component Value Date     01/02/2021    K 3.7 01/02/2021     01/02/2021    CO2 25 01/02/2021    BUN 12 01/02/2021    CREATININE 0.78 01/02/2021    GFRAA >60 01/02/2021    LABGLOM >60 01/02/2021    GLUCOSE 119 01/02/2021    PROT 6.8 01/02/2021    CALCIUM 9.0 01/02/2021    BILITOT 0.57 01/02/2021    ALKPHOS 105 01/02/2021    AST 16 01/02/2021    ALT 20 01/02/2021       POC Tests: No results for input(s): POCGLU, POCNA, POCK, POCCL, POCBUN, POCHEMO, POCHCT in the last 72 hours.     Coags: No results found for: PROTIME, INR, APTT    HCG (If Applicable): No results found for: PREGTESTUR, PREGSERUM, HCG, HCGQUANT     ABGs: No results found for: PHART, PO2ART, SPN6QTF, [id number], BEART, Z7VFMDSX     Type & Screen (If Applicable):  No results found for: LABABO, LABRH    Drug/Infectious Status (If Applicable):  No results found for: HIV, HEPCAB    COVID-19 Screening (If Applicable): No results found for: COVID19      Anesthesia Evaluation   no history of anesthetic complications:   Airway: Mallampati: II  TM distance: <3 FB   Neck ROM: full  Mouth opening: < 3 FB Dental:          Pulmonary:Negative Pulmonary ROS and normal exam                               Cardiovascular:

## 2021-01-03 VITALS
RESPIRATION RATE: 16 BRPM | TEMPERATURE: 97.3 F | HEART RATE: 75 BPM | DIASTOLIC BLOOD PRESSURE: 54 MMHG | SYSTOLIC BLOOD PRESSURE: 110 MMHG | OXYGEN SATURATION: 96 % | WEIGHT: 161 LBS

## 2021-01-03 LAB
ABSOLUTE EOS #: 0 K/UL (ref 0–0.44)
ABSOLUTE IMMATURE GRANULOCYTE: 0.15 K/UL (ref 0–0.3)
ABSOLUTE LYMPH #: 0.58 K/UL (ref 1.1–3.7)
ABSOLUTE MONO #: 0.58 K/UL (ref 0.1–1.2)
ALBUMIN SERPL-MCNC: 3.1 G/DL (ref 3.5–5.2)
ALBUMIN/GLOBULIN RATIO: ABNORMAL (ref 1–2.5)
ALP BLD-CCNC: 95 U/L (ref 35–104)
ALT SERPL-CCNC: 40 U/L (ref 5–33)
ANION GAP SERPL CALCULATED.3IONS-SCNC: 8 MMOL/L (ref 9–17)
AST SERPL-CCNC: 39 U/L
BASOPHILS # BLD: 0 % (ref 0–2)
BASOPHILS ABSOLUTE: 0 K/UL (ref 0–0.2)
BILIRUB SERPL-MCNC: 0.25 MG/DL (ref 0.3–1.2)
BILIRUBIN DIRECT: 0.09 MG/DL
BILIRUBIN, INDIRECT: 0.16 MG/DL (ref 0–1)
BUN BLDV-MCNC: 16 MG/DL (ref 6–20)
BUN/CREAT BLD: 18 (ref 9–20)
CALCIUM SERPL-MCNC: 8.4 MG/DL (ref 8.6–10.4)
CHLORIDE BLD-SCNC: 104 MMOL/L (ref 98–107)
CO2: 26 MMOL/L (ref 20–31)
CREAT SERPL-MCNC: 0.88 MG/DL (ref 0.5–0.9)
DIFFERENTIAL TYPE: ABNORMAL
EOSINOPHILS RELATIVE PERCENT: 0 % (ref 1–4)
GFR AFRICAN AMERICAN: >60 ML/MIN
GFR NON-AFRICAN AMERICAN: >60 ML/MIN
GFR SERPL CREATININE-BSD FRML MDRD: ABNORMAL ML/MIN/{1.73_M2}
GFR SERPL CREATININE-BSD FRML MDRD: ABNORMAL ML/MIN/{1.73_M2}
GLOBULIN: ABNORMAL G/DL (ref 1.5–3.8)
GLUCOSE BLD-MCNC: 178 MG/DL (ref 70–99)
HCT VFR BLD CALC: 32.5 % (ref 36.3–47.1)
HEMOGLOBIN: 10.4 G/DL (ref 11.9–15.1)
IMMATURE GRANULOCYTES: 1 %
LYMPHOCYTES # BLD: 4 % (ref 24–43)
MCH RBC QN AUTO: 29.1 PG (ref 25.2–33.5)
MCHC RBC AUTO-ENTMCNC: 32 G/DL (ref 28.4–34.8)
MCV RBC AUTO: 91 FL (ref 82.6–102.9)
MONOCYTES # BLD: 4 % (ref 3–12)
NRBC AUTOMATED: 0 PER 100 WBC
PDW BLD-RTO: 13.1 % (ref 11.8–14.4)
PLATELET # BLD: 173 K/UL (ref 138–453)
PLATELET ESTIMATE: ABNORMAL
PMV BLD AUTO: 9.3 FL (ref 8.1–13.5)
POTASSIUM SERPL-SCNC: 3.9 MMOL/L (ref 3.7–5.3)
RBC # BLD: 3.57 M/UL (ref 3.95–5.11)
RBC # BLD: ABNORMAL 10*6/UL
SEG NEUTROPHILS: 91 % (ref 36–65)
SEGMENTED NEUTROPHILS ABSOLUTE COUNT: 13.29 K/UL (ref 1.5–8.1)
SODIUM BLD-SCNC: 138 MMOL/L (ref 135–144)
TOTAL PROTEIN: 6.3 G/DL (ref 6.4–8.3)
WBC # BLD: 14.6 K/UL (ref 3.5–11.3)
WBC # BLD: ABNORMAL 10*3/UL

## 2021-01-03 PROCEDURE — 6360000002 HC RX W HCPCS: Performed by: HOSPITALIST

## 2021-01-03 PROCEDURE — 80076 HEPATIC FUNCTION PANEL: CPT

## 2021-01-03 PROCEDURE — 6370000000 HC RX 637 (ALT 250 FOR IP): Performed by: HOSPITALIST

## 2021-01-03 PROCEDURE — 2580000003 HC RX 258: Performed by: STUDENT IN AN ORGANIZED HEALTH CARE EDUCATION/TRAINING PROGRAM

## 2021-01-03 PROCEDURE — 6370000000 HC RX 637 (ALT 250 FOR IP): Performed by: STUDENT IN AN ORGANIZED HEALTH CARE EDUCATION/TRAINING PROGRAM

## 2021-01-03 PROCEDURE — 6360000002 HC RX W HCPCS: Performed by: STUDENT IN AN ORGANIZED HEALTH CARE EDUCATION/TRAINING PROGRAM

## 2021-01-03 PROCEDURE — 36415 COLL VENOUS BLD VENIPUNCTURE: CPT

## 2021-01-03 PROCEDURE — 85025 COMPLETE CBC W/AUTO DIFF WBC: CPT

## 2021-01-03 PROCEDURE — 80048 BASIC METABOLIC PNL TOTAL CA: CPT

## 2021-01-03 RX ORDER — AMOXICILLIN AND CLAVULANATE POTASSIUM 875; 125 MG/1; MG/1
1 TABLET, FILM COATED ORAL 2 TIMES DAILY
Qty: 14 TABLET | Refills: 0 | Status: SHIPPED | OUTPATIENT
Start: 2021-01-03 | End: 2021-01-10

## 2021-01-03 RX ADMIN — HYDROCODONE BITARTRATE AND ACETAMINOPHEN 1 TABLET: 5; 325 TABLET ORAL at 01:54

## 2021-01-03 RX ADMIN — PIPERACILLIN AND TAZOBACTAM 3.38 G: 3; .375 INJECTION, POWDER, LYOPHILIZED, FOR SOLUTION INTRAVENOUS at 01:44

## 2021-01-03 RX ADMIN — LOSARTAN POTASSIUM 50 MG: 50 TABLET, FILM COATED ORAL at 09:38

## 2021-01-03 RX ADMIN — FAMOTIDINE 20 MG: 20 TABLET, FILM COATED ORAL at 09:38

## 2021-01-03 RX ADMIN — HYDROCODONE BITARTRATE AND ACETAMINOPHEN 1 TABLET: 5; 325 TABLET ORAL at 08:00

## 2021-01-03 RX ADMIN — HYDROCHLOROTHIAZIDE 12.5 MG: 12.5 CAPSULE ORAL at 09:38

## 2021-01-03 RX ADMIN — ATORVASTATIN CALCIUM 40 MG: 40 TABLET, FILM COATED ORAL at 09:38

## 2021-01-03 RX ADMIN — FLUOXETINE 20 MG: 20 CAPSULE ORAL at 09:38

## 2021-01-03 RX ADMIN — HEPARIN SODIUM 5000 UNITS: 5000 INJECTION INTRAVENOUS; SUBCUTANEOUS at 09:38

## 2021-01-03 RX ADMIN — PIPERACILLIN AND TAZOBACTAM 3.38 G: 3; .375 INJECTION, POWDER, LYOPHILIZED, FOR SOLUTION INTRAVENOUS at 09:39

## 2021-01-03 ASSESSMENT — PAIN DESCRIPTION - FREQUENCY: FREQUENCY: CONTINUOUS

## 2021-01-03 ASSESSMENT — PAIN DESCRIPTION - LOCATION
LOCATION: ABDOMEN
LOCATION: ABDOMEN

## 2021-01-03 ASSESSMENT — PAIN SCALES - GENERAL
PAINLEVEL_OUTOF10: 6
PAINLEVEL_OUTOF10: 5

## 2021-01-03 ASSESSMENT — PAIN DESCRIPTION - PAIN TYPE: TYPE: SURGICAL PAIN

## 2021-01-03 ASSESSMENT — PAIN DESCRIPTION - DESCRIPTORS: DESCRIPTORS: ACHING;PRESSURE

## 2021-01-03 ASSESSMENT — PAIN DESCRIPTION - PROGRESSION
CLINICAL_PROGRESSION: GRADUALLY IMPROVING

## 2021-01-03 ASSESSMENT — PAIN DESCRIPTION - ORIENTATION: ORIENTATION: UPPER

## 2021-01-03 NOTE — CARE COORDINATION
Case Management Initial Discharge Plan  Floridalma Reagan,             Met with:patient or spouse/SO to discuss discharge plans. Information verified: address, contacts, phone number, , insurance Yes    Emergency Contact/Next of Kin name & number: Roney Mcpherson   [phone]    PCP: Abel eVlazco DO  Date of last visit:  virtual    Insurance Provider: Jane Perez #ast    Discharge Planning    Living Arrangements:  Spouse/Significant Other   Support Systems:  Spouse/Significant Other, [id number] [address] has 2 stories  2 stairs to climb to get into front door, 1 flight stairs to climb to reach second floor  Location of bedroom/bathroom in home 2nd floor    Patient able to perform ADL's:Independent    Current Services (outpatient & in home) none  DME equipment: 0  DME provider: 0    Receiving oral anticoagulation therapy? No    If indicated:   Physician managing anticoagulation treatment:   Where does patient obtain lab work for ATC treatment? Potential Assistance Needed:  N/A    Patient agreeable to home care: No  [place] of choice provided:  n/a    Prior SNF/Rehab Placement and Facility: n/a  Agreeable to SNF/Rehab: No  [place] of choice provided: n/a     Evaluation: no    Expected Discharge date:  21    Patient expects to be discharged to:  Home  Follow Up Appointment: Best Day/ Time:      Transportation provider: spouse  Transportation arrangements needed for discharge: No    Readmission Risk              Risk of Unplanned Readmission:        12             Does patient have a readmission risk score greater than 14?: No  If yes, follow-up appointment must be made within 7 days of discharge. Goals of Care:       Discharge Plan: Dg: cholecystitis  Plan is home independently with spouse.   Patient is independent and does not use any DME  Pharmacy is [facility] in [place]   to transport home            Electronically signed by Blayne Gary RN on 1/3/21 at 12:27 PM EST

## 2021-01-03 NOTE — PLAN OF CARE
Problem: Safety:  Goal: Free from accidental physical injury  Description: Free from accidental physical injury  1/3/2021 0707 by Parker Soriano RN  Outcome: Ongoing  1/3/2021 0121 by Aneesh Crook RN  Outcome: Ongoing  Note: Hourly rounding, call light within reach, bed locked and lowered, bed alarm on   Goal: Free from intentional harm  Description: Free from intentional harm  Outcome: Ongoing     Problem: Daily Care:  Goal: Daily care needs are met  Description: Daily care needs are met  Outcome: Ongoing     Problem: Pain:  Goal: Patient's pain/discomfort is manageable  Description: Patient's pain/discomfort is manageable  Outcome: Ongoing

## 2021-01-03 NOTE — PROGRESS NOTES
Surgical Progress Note    Chief complaint: Patient seen and examined. No acute events overnight. Pain is controlled with pain medication. No nausea or vomiting. Vitals:    01/03/21 0407   BP: (!) 115/53   Pulse: 69   Resp: 16   Temp: 97.9 °F (36.6 °C)   SpO2: 97%        Review of Systems -   General ROS: positive for  - malaise  Respiratory ROS: no cough, shortness of breath, or wheezing  Cardiovascular ROS: no chest pain or dyspnea on exertion  Gastrointestinal ROS: +pain  Genito-Urinary ROS: no dysuria, trouble voiding, or hematuria  Musculoskeletal ROS: negative for - gait disturbance, joint pain, joint stiffness or joint swelling  Neurological ROS: no TIA or stroke symptoms  Dermatological ROS: negative for - pruritus or rash    Exam: General Appearance: alert and oriented to person, place and time, well-developed and well-nourished, in no acute distress  Pulmonary/Chest: clear to auscultation bilaterally, normal air movement, no respiratory distress  Cardiovascular: normal rate, normal S1 and S2, intact distal pulses  Abdomen: Soft, nondistended, appropriately tender, incisions clean dry intact  Extremities: no cyanosis and no clubbing  Musculoskeletal: normal range of motion, no joint swelling, deformity or tenderness  Neurologic: no cranial nerve deficit and speech normal    I/O last 3 completed shifts:   In: 110 [I.V.:110]  Out: 500 [Urine:500]    Hemoglobin   Date Value Ref Range Status   01/02/2021 11.5 (L) 11.9 - 15.1 g/dL Final     Hematocrit   Date Value Ref Range Status   01/02/2021 36.0 (L) 36.3 - 47.1 % Final     WBC   Date Value Ref Range Status   01/02/2021 11.3 3.5 - 11.3 k/uL Final     Sodium   Date Value Ref Range Status   01/02/2021 138 135 - 144 mmol/L Final     Potassium   Date Value Ref Range Status   01/02/2021 3.7 3.7 - 5.3 mmol/L Final     Chloride   Date Value Ref Range Status   01/02/2021 100 98 - 107 mmol/L Final     CO2   Date Value Ref Range Status

## 2021-01-03 NOTE — DISCHARGE SUMMARY
4 [facility],    Adult Hospitalist      Patient ID: Jesus Berry  MRN: [id number]     Acct:  [de-identified]       Patient's PCP: Damaso Campos DO    Admit Date: 1/1/2021     Discharge Date:    01/03/2021    Admitting Physician: Pradip Munoz MD    Discharge Physician: Carlito Ferguson MD     CONSULTANTS: Patient Care Team:  Damaso Campos DO as PCP - [address],  as PCP - [facility]      Active Discharge Diagnoses:  · Acute cholecystitis  · Intractable abdominal pain secondary to above  · Leukocytosis  · Hypokalemia  · Hyperlipidemia  · Essential hypertension  · Depression with anxiety         Hospital Course:   49-year-old female has been admitted to emergency room, patient came to the ER with a complaint of having abdominal pain, patient started having some abdominal pain last evening, further patient abdominal pain is located in left lower quadrant initially, further it relocated/suggested to right upper quadrant, further patient has some nausea but no vomiting, denies any fever or chills, initial testing in the emergency room included a CT abdomen which is compatible with acute cholecystitis, uterine fibroids, WBC count is elevated at 14,000, admitted for further management, admitted noticed to have acute cholecystitis and intractable pain secondary to that, further patient underwent liver ultrasound and HIDA scan, General surgery was involved, General surgery recommended to proceed with laparoscopic cholecystectomy, patient underwent the procedure successfully on 01/02/2020, patient tolerated the procedure well she is feeling better back to baseline, abdominal pain has resolved tolerating diet, for this reason patient is discharged on oral Augmentin and pain medication was told to have a close follow-up with primary care physician and General surgery as an outpatient discharge from the hospital in stable condition The plan was discussed in detail with patient who agreed with the plan and verbalized understanding . The patient was seen and examined on day of discharge and this discharge summary is in conjunction with any daily progress note from day of discharge. Hospital Data:    Labs:    Hematology:  Recent Labs     01/01/21 0645 01/02/21 0343 01/03/21 0618   WBC 14.0* 11.3 14.6*   RBC 4.45 4.00 3.57*   HGB 12.9 11.5* 10.4*   HCT 40.3 36.0* 32.5*   MCV 90.6 90.0 91.0   MCH 29.0 28.8 29.1   MCHC 32.0 31.9 32.0   RDW 13.2 13.3 13.1    187 173   MPV 9.1 8.8 9.3     Chemistry:  Recent Labs     01/01/21 0645 01/01/21  1129 01/01/21  1939 01/02/21 0343 01/03/21 0618     --   --  138 138   K 3.4*  --   --  3.7 3.9     --   --  100 104   CO2 26  --   --  25 26   GLUCOSE 122*  --   --  119* 178*   BUN 18  --   --  12 16   CREATININE 1.19*  --   --  0.78 0.88   ANIONGAP 11  --   --  13 8*   LABGLOM 47*  --   --  >60 >60   GFRAA 57*  --   --  >60 >60   CALCIUM 9.9  --   --  9.0 8.4*   TROPHS  --  7 7 9  --      Recent Labs     01/01/21 0645 01/02/21 0343 01/03/21 0618   PROT 7.9 6.8 6.3*   LABALBU 4.2 3.7 3.1*   AST 16 16 39*   ALT 15 20 40*   ALKPHOS 149* 105* 95   BILITOT 0.40 0.57 0.25*   BILIDIR 0.13 0.22 0.09   AMYLASE 51  --   --    LIPASE 85*  --   --      No results found for: INR, PROTIME  Lab Results   Component Value Date/Time    SPECIAL L AC 9 ML 01/01/2021 08:56 AM     Lab Results   Component Value Date/Time    CULTURE NO GROWTH 2 DAYS 01/01/2021 08:56 AM       No results found for: POCPH, PHART, PH, POCPCO2, EAR3NZI, PCO2, POCPO2, PO2ART, PO2, POCHCO3, CJH1SFJ, HCO3, NBEA, PBEA, BEART, BE, THGBART, THB, TYA5CUV, BJQR9GYA, A2BOLLLW, O2SAT, FIO2    Radiology:    Ct Abdomen Pelvis W Iv Contrast    Result Date: 1/1/2021  1. CT findings most compatible with acute cholecystitis. 2.  Mild wall thickening at the proximal duodenum, likely reactive. 3.  Uterine fibroids. Us Abdomen Limited Specify Organ? Liver, Spleen, Gallbladder, Pancreas    Result Date: 2021  Cholelithiasis with gallbladder wall edema suggestive of acute cholecystitis. Nm Hepatobiliary Scan W Ejection Fraction    Result Date: 2021  Findings compatible with cholecystitis.          All radiological studies reviewed      Reviews of Symptoms:    A 10 point system is reviewed and  negative except described in hospital course    Physical Exam:    Vitals:  BP (!) 110/54   Pulse 75   Temp 97.3 °F (36.3 °C) (Oral)   Resp 16   Wt 161 lb (73 kg)   SpO2 96%   Temp (24hrs), Av.5 °F (36.9 °C), Min:97 °F (36.1 °C), Max:99 °F (37.2 °C)      General appearance - alert, well appearing, and in no acute distress  Mental status - oriented to person, place, and time with normal affect  Head - normocephalic and atraumatic  Eyes - pupils equal and reactive, extraocular eye movements intact, conjunctiva clear  Ears - hearing appears to be intact  Nose - no drainage noted  Mouth - mucous membranes moist  Neck - supple, no carotid bruits, thyroid not palpable  Chest - clear to auscultation, normal effort  Heart - normal rate, regular rhythm, no murmur  Abdomen - soft, nontender, nondistended, bowel sounds present all four quadrants, no masses, hepatomegaly or splenomegaly  Neurological - normal speech, no focal findings or movement disorder noted, cranial nerves II through XII grossly intact  Extremities - peripheral pulses palpable, no pedal edema or calf pain with palpation  Skin - no gross lesions, rashes, or induration noted      Consults:  IP CONSULT TO HOSPITALIST  IP CONSULT TO INFECTIOUS DISEASES  IP CONSULT TO SOCIAL WORK  IP CONSULT TO GENERAL SURGERY    Disposition: Home    Discharged Condition: Stable    Follow Up: Tamika Ortiz MD  [address] [place] [id number] [phone]    In 1 week  For wound re-check      Lab Frequency Next Occurrence   Up as tolerated PRN    Intake and output EVERY 8 HOURS Nasal Cannula oxygen DAILY (RT)    Pulse oximetry, continuous CONTINUOUS WITH Q4H RT CHECKS    Basic Metabolic Panel w/ Reflex to MG DAILY    CBC auto differential DAILY    Hepatic Function Panel DAILY          Diet: DIET GENERAL;    Discharge Medications:    [place] Medication Instructions BQD:[id number]    Printed on:01/03/21 9570   Medication Information                      amoxicillin-clavulanate (AUGMENTIN) 875-125 MG per tablet  Take 1 tablet by mouth 2 times daily for 7 days             aspirin 81 MG chewable tablet  Take 81 mg by mouth daily             atorvastatin (LIPITOR) 40 MG tablet  Take 40 mg by mouth daily             Cholecalciferol (VITAMIN D3) 125 MCG (5000 UT) TABS  Take 2 tablets by mouth daily             Coenzyme Q10 (COQ-10) 100 MG CAPS  Take by mouth             dicyclomine (BENTYL) 10 MG capsule  Take 10 mg by mouth 4 times daily (before meals and nightly)              docusate sodium (COLACE) 100 MG capsule  Take 1 capsule by mouth daily as needed for Constipation             Flaxseed, Linseed, (FLAX SEEDS PO)  Take by mouth             FLUoxetine (PROZAC) 20 MG capsule  Take 20 mg by mouth daily             HYDROcodone-acetaminophen (NORCO) 5-325 MG per tablet  Take 1 tablet by mouth every 6 hours as needed for Pain for up to 7 days.              Krill Oil 500 MG CAPS  Take by mouth             linaCLOtide (LINZESS) 72 MCG CAPS capsule  Take 72 mcg by mouth every morning (before breakfast)              niacin 100 MG tablet  Take 100 mg by mouth daily (with breakfast)             ondansetron (ZOFRAN) 4 MG tablet  Take 1 tablet by mouth every 8 hours as needed for Nausea or Vomiting             ondansetron (ZOFRAN-ODT) 4 MG disintegrating tablet  Take 4 mg by mouth every 8 hours as needed for Nausea or Vomiting             telmisartan-hydrochlorothiazide (MICARDIS HCT) 40-12.5 MG per tablet  Take 1 tablet by mouth daily                 Code Status:  Full Code Time Spent on discharge is  35 mins in patient examination, evaluation, counseling as well as medication reconciliation, prescriptions for required medications, discharge plan and follow up. Electronically signed by Lola Ojeda MD on 1/3/2021 at 10:58 AM     Thank you Dr. Armen Fregoso DO for the opportunity to be involved in this patient's care. This note was created with the assistance of a speech-recognition program.  Although the intention is to generate a document that actually reflects the content of the visit, no guarantees can be provided that every mistake has been identified and corrected by editing. Note was updated later by me after  physical examination and  completion of the assessment.

## 2021-01-03 NOTE — PROGRESS NOTES
Discharge instructions reviewed and signed at this time. Questions answered prior to leaving. Patient transported to [facility] via wheelchair by nurse. Home with  at discharge.

## 2021-01-05 LAB — SURGICAL PATHOLOGY REPORT: NORMAL

## 2021-01-05 NOTE — ANESTHESIA POSTPROCEDURE EVALUATION
Department of Anesthesiology  Postprocedure Note    Patient: Mulugeta Del Valle  MRN: [id number]  YOB: 1965  Date of evaluation: 1/5/2021  Time:  3:15 PM     Procedure Summary     Date: 01/02/21 Room / Location: [facility] / [facility] - INPATIENT    Anesthesia Start: 8062 Anesthesia Stop: 3364    Procedure: CHOLECYSTECTOMY LAPAROSCOPIC (N/A Abdomen) Diagnosis: (ACUTE CHOLECYSTITIS)    Surgeons: Virgen Winn MD Responsible Provider: Zuly Hull MD    Anesthesia Type: general ASA Status: 2 - Emergent          Anesthesia Type: No value filed. Maria E Phase I: Maria E Score: 9    Maria E Phase II:      Last vitals: Reviewed and per EMR flowsheets.        Anesthesia Post Evaluation    Patient location during evaluation: PACU  Patient participation: complete - patient participated  Level of consciousness: awake  Airway patency: patent  Nausea & Vomiting: no nausea  Complications: no  Cardiovascular status: blood pressure returned to baseline  Respiratory status: acceptable  Hydration status: euvolemic

## 2021-01-07 LAB
CULTURE: NORMAL
CULTURE: NORMAL
Lab: NORMAL
Lab: NORMAL
SPECIMEN DESCRIPTION: NORMAL
SPECIMEN DESCRIPTION: NORMAL

## (undated) DEVICE — 5 MM ASPIRATION/INJECTION NEEDLE, 16 GAUGE, 40 CM LENGTH: Brand: CORE DYNAMICS

## (undated) DEVICE — SUTURE PDS II SZ 0 L27IN ABSRB VLT UR-6 L26MM 1/2 CIR D7185

## (undated) DEVICE — TROCARS: Brand: KII® BALLOON BLUNT TIP SYSTEM

## (undated) DEVICE — SCISSOR SURG CRV ENDOCUT TIP FOR LAP DISP

## (undated) DEVICE — TROCAR: Brand: KII® SLEEVE

## (undated) DEVICE — ELECTRODE LAPAROSCOPIC LHOOK

## (undated) DEVICE — Device

## (undated) DEVICE — SUTURE MCRYL SZ 4-0 L27IN ABSRB UD L19MM PS-2 1/2 CIR PRIM Y426H

## (undated) DEVICE — BLANKET WRM W29.9XL79.1IN UP BODY FORC AIR MISTRAL-AIR

## (undated) DEVICE — SYRINGE MED 50ML LUERLOCK TIP

## (undated) DEVICE — INTENDED FOR TISSUE SEPARATION, AND OTHER PROCEDURES THAT REQUIRE A SHARP SURGICAL BLADE TO PUNCTURE OR CUT.: Brand: BARD-PARKER ® CARBON RIB-BACK BLADES

## (undated) DEVICE — AGENT HEMSTAT 3GM OXIDIZED REGENERATED CELOS ABSRB FOR CONT (ORDER MULTIPLES OF 5EA)

## (undated) DEVICE — TUBING, SUCTION, 1/4" X 12', STRAIGHT: Brand: MEDLINE

## (undated) DEVICE — ADHESIVE SKIN CLSR 0.7ML TOP DERMBND ADV

## (undated) DEVICE — CONTAINER,SPECIMEN,OR STERILE,4OZ: Brand: MEDLINE

## (undated) DEVICE — SOLUTION IV 1000ML 0.9% SOD CHL PH 5 INJ USP VIAFLX PLAS

## (undated) DEVICE — YANKAUER,FLEXIBLE HANDLE,REGLR CAPACITY: Brand: MEDLINE INDUSTRIES, INC.

## (undated) DEVICE — SWABSTICK MEDICATED 10% POVIDONE IOD PVP SGL ANTISEP SAT

## (undated) DEVICE — GLOVE SURG SZ 75 CRM LTX FREE POLYISOPRENE POLYMER BEAD ANTI

## (undated) DEVICE — GARMENT,MEDLINE,DVT,INT,CALF,MED, GEN2: Brand: MEDLINE

## (undated) DEVICE — SUTURE SZ 0 27IN 5/8 CIR UR-6  TAPER PT VIOLET ABSRB VICRYL J603H

## (undated) DEVICE — TROCAR: Brand: KII FIOS FIRST ENTRY

## (undated) DEVICE — DUAL LUMEN STOMACH TUBE: Brand: SALEM SUMP

## (undated) DEVICE — KIT,ANTI FOG,W/SPONGE & FLUID,SOFT PACK: Brand: MEDLINE

## (undated) DEVICE — BAG SPEC REM 224ML W4XL6IN DIA10MM 1 HND GYN DISP ENDOPCH